# Patient Record
Sex: FEMALE | Race: WHITE | NOT HISPANIC OR LATINO | Employment: OTHER | URBAN - METROPOLITAN AREA
[De-identification: names, ages, dates, MRNs, and addresses within clinical notes are randomized per-mention and may not be internally consistent; named-entity substitution may affect disease eponyms.]

---

## 2017-03-13 RX ORDER — LEVOTHYROXINE SODIUM 88 MCG
TABLET ORAL
Qty: 90 TABLET | Refills: 0 | Status: SHIPPED | OUTPATIENT
Start: 2017-03-13 | End: 2017-03-27

## 2017-03-20 ENCOUNTER — LAB (OUTPATIENT)
Dept: ENDOCRINOLOGY | Age: 77
End: 2017-03-20

## 2017-03-20 DIAGNOSIS — E06.3 LYMPHOCYTIC THYROIDITIS: Primary | ICD-10-CM

## 2017-03-20 DIAGNOSIS — E03.8 OTHER SPECIFIED HYPOTHYROIDISM: ICD-10-CM

## 2017-03-20 DIAGNOSIS — E06.3 LYMPHOCYTIC THYROIDITIS: ICD-10-CM

## 2017-03-20 LAB
ALBUMIN SERPL-MCNC: 4.2 G/DL (ref 3.5–5.2)
ALBUMIN/GLOB SERPL: 1.9 G/DL
ALP SERPL-CCNC: 58 U/L (ref 39–117)
ALT SERPL-CCNC: 24 U/L (ref 1–33)
AST SERPL-CCNC: 25 U/L (ref 1–32)
BILIRUB SERPL-MCNC: 0.3 MG/DL (ref 0.1–1.2)
BUN SERPL-MCNC: 15 MG/DL (ref 8–23)
BUN/CREAT SERPL: 18.3 (ref 7–25)
CALCIUM SERPL-MCNC: 9.6 MG/DL (ref 8.6–10.5)
CHLORIDE SERPL-SCNC: 98 MMOL/L (ref 98–107)
CO2 SERPL-SCNC: 29 MMOL/L (ref 22–29)
CREAT SERPL-MCNC: 0.82 MG/DL (ref 0.57–1)
GLOBULIN SER CALC-MCNC: 2.2 GM/DL
GLUCOSE SERPL-MCNC: 90 MG/DL (ref 65–99)
POTASSIUM SERPL-SCNC: 4.4 MMOL/L (ref 3.5–5.2)
PROT SERPL-MCNC: 6.4 G/DL (ref 6–8.5)
SODIUM SERPL-SCNC: 140 MMOL/L (ref 136–145)
T4 FREE SERPL-MCNC: 1.24 NG/DL (ref 0.93–1.7)
TSH SERPL DL<=0.005 MIU/L-ACNC: 0.57 MIU/ML (ref 0.27–4.2)

## 2017-03-27 ENCOUNTER — OFFICE VISIT (OUTPATIENT)
Dept: ENDOCRINOLOGY | Age: 77
End: 2017-03-27

## 2017-03-27 VITALS
OXYGEN SATURATION: 97 % | DIASTOLIC BLOOD PRESSURE: 70 MMHG | SYSTOLIC BLOOD PRESSURE: 120 MMHG | HEART RATE: 71 BPM | WEIGHT: 169.4 LBS | HEIGHT: 65 IN | BODY MASS INDEX: 28.22 KG/M2

## 2017-03-27 DIAGNOSIS — E03.8 OTHER SPECIFIED HYPOTHYROIDISM: Primary | ICD-10-CM

## 2017-03-27 DIAGNOSIS — R53.82 CHRONIC FATIGUE: ICD-10-CM

## 2017-03-27 DIAGNOSIS — R63.5 ABNORMAL WEIGHT GAIN: ICD-10-CM

## 2017-03-27 DIAGNOSIS — E06.3 LYMPHOCYTIC THYROIDITIS: ICD-10-CM

## 2017-03-27 PROCEDURE — 99214 OFFICE O/P EST MOD 30 MIN: CPT | Performed by: INTERNAL MEDICINE

## 2017-06-13 ENCOUNTER — TELEPHONE (OUTPATIENT)
Dept: ENDOCRINOLOGY | Age: 77
End: 2017-06-13

## 2017-06-13 RX ORDER — LEVOTHYROXINE SODIUM 88 MCG
88 TABLET ORAL DAILY
Qty: 90 TABLET | Refills: 1 | Status: SHIPPED | OUTPATIENT
Start: 2017-06-13 | End: 2017-10-27 | Stop reason: SDUPTHER

## 2017-06-13 RX ORDER — LEVOTHYROXINE SODIUM 88 MCG
TABLET ORAL
Qty: 90 TABLET | Refills: 0 | Status: SHIPPED | OUTPATIENT
Start: 2017-06-13 | End: 2017-07-26 | Stop reason: SDUPTHER

## 2017-07-26 RX ORDER — LEVOTHYROXINE SODIUM 88 MCG
88 TABLET ORAL DAILY
Qty: 14 TABLET | Refills: 0 | Status: SHIPPED | OUTPATIENT
Start: 2017-07-26 | End: 2017-10-27

## 2017-07-26 RX ORDER — LEVOTHYROXINE SODIUM 88 MCG
TABLET ORAL
Qty: 90 TABLET | Refills: 0 | Status: SHIPPED | OUTPATIENT
Start: 2017-07-26 | End: 2017-07-26 | Stop reason: SDUPTHER

## 2017-09-25 ENCOUNTER — LAB (OUTPATIENT)
Dept: ENDOCRINOLOGY | Age: 77
End: 2017-09-25

## 2017-09-25 DIAGNOSIS — E03.8 OTHER SPECIFIED HYPOTHYROIDISM: Primary | ICD-10-CM

## 2017-09-25 DIAGNOSIS — E03.8 OTHER SPECIFIED HYPOTHYROIDISM: ICD-10-CM

## 2017-09-25 LAB
ALBUMIN SERPL-MCNC: 4.3 G/DL (ref 3.5–5.2)
ALBUMIN/GLOB SERPL: 1.7 G/DL
ALP SERPL-CCNC: 64 U/L (ref 39–117)
ALT SERPL-CCNC: 21 U/L (ref 1–33)
AST SERPL-CCNC: 22 U/L (ref 1–32)
BILIRUB SERPL-MCNC: 0.3 MG/DL (ref 0.1–1.2)
BUN SERPL-MCNC: 16 MG/DL (ref 8–23)
BUN/CREAT SERPL: 19 (ref 7–25)
CALCIUM SERPL-MCNC: 9.7 MG/DL (ref 8.6–10.5)
CHLORIDE SERPL-SCNC: 99 MMOL/L (ref 98–107)
CO2 SERPL-SCNC: 26.3 MMOL/L (ref 22–29)
CREAT SERPL-MCNC: 0.84 MG/DL (ref 0.57–1)
GLOBULIN SER CALC-MCNC: 2.5 GM/DL
GLUCOSE SERPL-MCNC: 97 MG/DL (ref 65–99)
POTASSIUM SERPL-SCNC: 4.8 MMOL/L (ref 3.5–5.2)
PROT SERPL-MCNC: 6.8 G/DL (ref 6–8.5)
SODIUM SERPL-SCNC: 139 MMOL/L (ref 136–145)
T4 FREE SERPL-MCNC: 1.24 NG/DL (ref 0.93–1.7)
TSH SERPL DL<=0.005 MIU/L-ACNC: 0.57 MIU/ML (ref 0.27–4.2)

## 2017-10-27 ENCOUNTER — OFFICE VISIT (OUTPATIENT)
Dept: ENDOCRINOLOGY | Age: 77
End: 2017-10-27

## 2017-10-27 VITALS
BODY MASS INDEX: 28.82 KG/M2 | HEART RATE: 59 BPM | SYSTOLIC BLOOD PRESSURE: 116 MMHG | WEIGHT: 168.8 LBS | HEIGHT: 64 IN | DIASTOLIC BLOOD PRESSURE: 70 MMHG

## 2017-10-27 DIAGNOSIS — E06.9 THYROIDITIS: ICD-10-CM

## 2017-10-27 DIAGNOSIS — R63.5 ABNORMAL WEIGHT GAIN: ICD-10-CM

## 2017-10-27 DIAGNOSIS — E03.8 OTHER SPECIFIED HYPOTHYROIDISM: Primary | ICD-10-CM

## 2017-10-27 DIAGNOSIS — R53.82 CHRONIC FATIGUE: ICD-10-CM

## 2017-10-27 DIAGNOSIS — E06.3 LYMPHOCYTIC THYROIDITIS: ICD-10-CM

## 2017-10-27 PROCEDURE — 99214 OFFICE O/P EST MOD 30 MIN: CPT | Performed by: INTERNAL MEDICINE

## 2017-10-27 RX ORDER — LEVOTHYROXINE SODIUM 75 MCG
75 TABLET ORAL DAILY
Qty: 90 TABLET | Refills: 2 | Status: SHIPPED | OUTPATIENT
Start: 2017-10-27 | End: 2018-08-28 | Stop reason: SDUPTHER

## 2018-04-11 ENCOUNTER — TELEPHONE (OUTPATIENT)
Dept: ENDOCRINOLOGY | Age: 78
End: 2018-04-11

## 2018-04-11 DIAGNOSIS — E03.8 OTHER SPECIFIED HYPOTHYROIDISM: Primary | ICD-10-CM

## 2018-04-11 DIAGNOSIS — E55.9 VITAMIN D DEFICIENCY: ICD-10-CM

## 2018-04-11 DIAGNOSIS — R53.82 CHRONIC FATIGUE: ICD-10-CM

## 2018-04-18 DIAGNOSIS — E55.9 VITAMIN D DEFICIENCY: ICD-10-CM

## 2018-04-18 DIAGNOSIS — E03.8 OTHER SPECIFIED HYPOTHYROIDISM: Primary | ICD-10-CM

## 2018-04-20 ENCOUNTER — RESULTS ENCOUNTER (OUTPATIENT)
Dept: ENDOCRINOLOGY | Age: 78
End: 2018-04-20

## 2018-04-20 DIAGNOSIS — E03.8 OTHER SPECIFIED HYPOTHYROIDISM: ICD-10-CM

## 2018-04-20 DIAGNOSIS — E55.9 VITAMIN D DEFICIENCY: ICD-10-CM

## 2018-04-24 ENCOUNTER — RESULTS ENCOUNTER (OUTPATIENT)
Dept: ENDOCRINOLOGY | Age: 78
End: 2018-04-24

## 2018-04-24 DIAGNOSIS — R53.82 CHRONIC FATIGUE: ICD-10-CM

## 2018-04-24 DIAGNOSIS — E55.9 VITAMIN D DEFICIENCY: ICD-10-CM

## 2018-04-24 DIAGNOSIS — E03.8 OTHER SPECIFIED HYPOTHYROIDISM: ICD-10-CM

## 2018-04-24 LAB
25(OH)D3+25(OH)D2 SERPL-MCNC: 35.7 NG/ML (ref 30–100)
ALBUMIN SERPL-MCNC: 4 G/DL (ref 3.5–5.2)
ALBUMIN/GLOB SERPL: 1.7 G/DL
ALP SERPL-CCNC: 59 U/L (ref 39–117)
ALT SERPL-CCNC: 18 U/L (ref 1–33)
AST SERPL-CCNC: 22 U/L (ref 1–32)
BILIRUB SERPL-MCNC: 0.3 MG/DL (ref 0.1–1.2)
BUN SERPL-MCNC: 15 MG/DL (ref 8–23)
BUN/CREAT SERPL: 18.3 (ref 7–25)
CALCIUM SERPL-MCNC: 8.8 MG/DL (ref 8.6–10.5)
CHLORIDE SERPL-SCNC: 99 MMOL/L (ref 98–107)
CO2 SERPL-SCNC: 26.8 MMOL/L (ref 22–29)
CREAT SERPL-MCNC: 0.82 MG/DL (ref 0.57–1)
GFR SERPLBLD CREATININE-BSD FMLA CKD-EPI: 68 ML/MIN/1.73
GFR SERPLBLD CREATININE-BSD FMLA CKD-EPI: 82 ML/MIN/1.73
GLOBULIN SER CALC-MCNC: 2.4 GM/DL
GLUCOSE SERPL-MCNC: 88 MG/DL (ref 65–99)
POTASSIUM SERPL-SCNC: 4.1 MMOL/L (ref 3.5–5.2)
PROT SERPL-MCNC: 6.4 G/DL (ref 6–8.5)
SODIUM SERPL-SCNC: 140 MMOL/L (ref 136–145)
T4 FREE SERPL-MCNC: 1.15 NG/DL (ref 0.93–1.7)
TSH SERPL DL<=0.005 MIU/L-ACNC: 0.95 MIU/ML (ref 0.27–4.2)

## 2018-05-01 ENCOUNTER — OFFICE VISIT (OUTPATIENT)
Dept: ENDOCRINOLOGY | Age: 78
End: 2018-05-01

## 2018-05-01 VITALS
SYSTOLIC BLOOD PRESSURE: 120 MMHG | BODY MASS INDEX: 28.68 KG/M2 | DIASTOLIC BLOOD PRESSURE: 66 MMHG | HEART RATE: 72 BPM | HEIGHT: 64 IN | WEIGHT: 168 LBS

## 2018-05-01 DIAGNOSIS — R53.82 CHRONIC FATIGUE: ICD-10-CM

## 2018-05-01 DIAGNOSIS — E03.8 OTHER SPECIFIED HYPOTHYROIDISM: Primary | ICD-10-CM

## 2018-05-01 DIAGNOSIS — E06.3 LYMPHOCYTIC THYROIDITIS: ICD-10-CM

## 2018-05-01 PROCEDURE — 99214 OFFICE O/P EST MOD 30 MIN: CPT | Performed by: INTERNAL MEDICINE

## 2018-05-06 ENCOUNTER — RESULTS ENCOUNTER (OUTPATIENT)
Dept: ENDOCRINOLOGY | Age: 78
End: 2018-05-06

## 2018-05-06 DIAGNOSIS — E06.3 LYMPHOCYTIC THYROIDITIS: ICD-10-CM

## 2018-05-06 DIAGNOSIS — R53.82 CHRONIC FATIGUE: ICD-10-CM

## 2018-05-06 DIAGNOSIS — E03.8 OTHER SPECIFIED HYPOTHYROIDISM: ICD-10-CM

## 2018-06-19 ENCOUNTER — RESULTS ENCOUNTER (OUTPATIENT)
Dept: ENDOCRINOLOGY | Age: 78
End: 2018-06-19

## 2018-06-19 DIAGNOSIS — E03.8 OTHER SPECIFIED HYPOTHYROIDISM: ICD-10-CM

## 2018-06-19 DIAGNOSIS — R53.82 CHRONIC FATIGUE: ICD-10-CM

## 2018-06-19 DIAGNOSIS — E06.3 LYMPHOCYTIC THYROIDITIS: ICD-10-CM

## 2018-08-29 RX ORDER — LEVOTHYROXINE SODIUM 75 MCG
TABLET ORAL
Qty: 90 TABLET | Refills: 1 | Status: SHIPPED | OUTPATIENT
Start: 2018-08-29 | End: 2019-03-14 | Stop reason: SDUPTHER

## 2018-09-05 DIAGNOSIS — E03.8 OTHER SPECIFIED HYPOTHYROIDISM: ICD-10-CM

## 2018-09-05 DIAGNOSIS — E55.9 VITAMIN D DEFICIENCY: Primary | ICD-10-CM

## 2018-09-07 ENCOUNTER — RESULTS ENCOUNTER (OUTPATIENT)
Dept: ENDOCRINOLOGY | Age: 78
End: 2018-09-07

## 2018-09-07 DIAGNOSIS — E55.9 VITAMIN D DEFICIENCY: ICD-10-CM

## 2018-09-07 DIAGNOSIS — E03.8 OTHER SPECIFIED HYPOTHYROIDISM: ICD-10-CM

## 2018-09-07 LAB
25(OH)D3+25(OH)D2 SERPL-MCNC: 45.4 NG/ML (ref 30–100)
ALBUMIN SERPL-MCNC: 4.4 G/DL (ref 3.5–5.2)
ALBUMIN/GLOB SERPL: 1.9 G/DL
ALP SERPL-CCNC: 72 U/L (ref 39–117)
ALT SERPL-CCNC: 21 U/L (ref 1–33)
AST SERPL-CCNC: 22 U/L (ref 1–32)
BILIRUB SERPL-MCNC: 0.2 MG/DL (ref 0.1–1.2)
BUN SERPL-MCNC: 13 MG/DL (ref 8–23)
BUN/CREAT SERPL: 16 (ref 7–25)
CALCIUM SERPL-MCNC: 9.3 MG/DL (ref 8.6–10.5)
CHLORIDE SERPL-SCNC: 99 MMOL/L (ref 98–107)
CO2 SERPL-SCNC: 27.5 MMOL/L (ref 22–29)
CREAT SERPL-MCNC: 0.81 MG/DL (ref 0.57–1)
GLOBULIN SER CALC-MCNC: 2.3 GM/DL
GLUCOSE SERPL-MCNC: 85 MG/DL (ref 65–99)
POTASSIUM SERPL-SCNC: 4.7 MMOL/L (ref 3.5–5.2)
PROT SERPL-MCNC: 6.7 G/DL (ref 6–8.5)
SODIUM SERPL-SCNC: 140 MMOL/L (ref 136–145)
T4 FREE SERPL-MCNC: 1.13 NG/DL (ref 0.93–1.7)
TSH SERPL DL<=0.005 MIU/L-ACNC: 0.94 MIU/ML (ref 0.27–4.2)

## 2018-09-18 ENCOUNTER — OFFICE VISIT (OUTPATIENT)
Dept: ENDOCRINOLOGY | Age: 78
End: 2018-09-18

## 2018-09-18 VITALS
WEIGHT: 166.2 LBS | HEART RATE: 67 BPM | SYSTOLIC BLOOD PRESSURE: 120 MMHG | DIASTOLIC BLOOD PRESSURE: 80 MMHG | HEIGHT: 64 IN | BODY MASS INDEX: 28.38 KG/M2

## 2018-09-18 DIAGNOSIS — R53.82 CHRONIC FATIGUE: ICD-10-CM

## 2018-09-18 DIAGNOSIS — E06.3 LYMPHOCYTIC THYROIDITIS: ICD-10-CM

## 2018-09-18 DIAGNOSIS — R63.5 ABNORMAL WEIGHT GAIN: ICD-10-CM

## 2018-09-18 DIAGNOSIS — E03.8 OTHER SPECIFIED HYPOTHYROIDISM: Primary | ICD-10-CM

## 2018-09-18 PROCEDURE — 99214 OFFICE O/P EST MOD 30 MIN: CPT | Performed by: INTERNAL MEDICINE

## 2019-03-05 ENCOUNTER — LAB (OUTPATIENT)
Dept: ENDOCRINOLOGY | Age: 79
End: 2019-03-05

## 2019-03-05 DIAGNOSIS — E55.9 VITAMIN D DEFICIENCY: ICD-10-CM

## 2019-03-05 DIAGNOSIS — E03.8 OTHER SPECIFIED HYPOTHYROIDISM: ICD-10-CM

## 2019-03-05 DIAGNOSIS — E03.8 OTHER SPECIFIED HYPOTHYROIDISM: Primary | ICD-10-CM

## 2019-03-05 LAB
25(OH)D3+25(OH)D2 SERPL-MCNC: 26.9 NG/ML (ref 30–100)
ALBUMIN SERPL-MCNC: 4.2 G/DL (ref 3.5–5.2)
ALBUMIN/GLOB SERPL: 1.7 G/DL
ALP SERPL-CCNC: 61 U/L (ref 39–117)
ALT SERPL-CCNC: 22 U/L (ref 1–33)
AST SERPL-CCNC: 23 U/L (ref 1–32)
BILIRUB SERPL-MCNC: 0.2 MG/DL (ref 0.1–1.2)
BUN SERPL-MCNC: 16 MG/DL (ref 8–23)
BUN/CREAT SERPL: 19 (ref 7–25)
CALCIUM SERPL-MCNC: 9.5 MG/DL (ref 8.6–10.5)
CHLORIDE SERPL-SCNC: 102 MMOL/L (ref 98–107)
CO2 SERPL-SCNC: 27.2 MMOL/L (ref 22–29)
CREAT SERPL-MCNC: 0.84 MG/DL (ref 0.57–1)
GLOBULIN SER CALC-MCNC: 2.5 GM/DL
GLUCOSE SERPL-MCNC: 88 MG/DL (ref 65–99)
POTASSIUM SERPL-SCNC: 5 MMOL/L (ref 3.5–5.2)
PROT SERPL-MCNC: 6.7 G/DL (ref 6–8.5)
SODIUM SERPL-SCNC: 142 MMOL/L (ref 136–145)
T4 FREE SERPL-MCNC: 1.12 NG/DL (ref 0.93–1.7)
TSH SERPL DL<=0.005 MIU/L-ACNC: 1.06 MIU/ML (ref 0.27–4.2)

## 2019-03-15 RX ORDER — LEVOTHYROXINE SODIUM 75 MCG
TABLET ORAL
Qty: 90 TABLET | Refills: 1 | Status: SHIPPED | OUTPATIENT
Start: 2019-03-15 | End: 2019-11-22

## 2019-03-19 ENCOUNTER — OFFICE VISIT (OUTPATIENT)
Dept: ENDOCRINOLOGY | Age: 79
End: 2019-03-19

## 2019-03-19 VITALS
BODY MASS INDEX: 28.22 KG/M2 | SYSTOLIC BLOOD PRESSURE: 126 MMHG | HEART RATE: 69 BPM | DIASTOLIC BLOOD PRESSURE: 72 MMHG | HEIGHT: 65 IN | WEIGHT: 169.4 LBS

## 2019-03-19 DIAGNOSIS — E03.8 OTHER SPECIFIED HYPOTHYROIDISM: Primary | ICD-10-CM

## 2019-03-19 DIAGNOSIS — R53.82 CHRONIC FATIGUE: ICD-10-CM

## 2019-03-19 DIAGNOSIS — R63.5 ABNORMAL WEIGHT GAIN: ICD-10-CM

## 2019-03-19 DIAGNOSIS — E06.3 LYMPHOCYTIC THYROIDITIS: ICD-10-CM

## 2019-03-19 PROCEDURE — 99214 OFFICE O/P EST MOD 30 MIN: CPT | Performed by: INTERNAL MEDICINE

## 2019-03-21 ENCOUNTER — TRANSCRIBE ORDERS (OUTPATIENT)
Dept: ADMINISTRATIVE | Facility: HOSPITAL | Age: 79
End: 2019-03-21

## 2019-03-21 DIAGNOSIS — N64.4 BREAST PAIN: Primary | ICD-10-CM

## 2019-03-26 ENCOUNTER — HOSPITAL ENCOUNTER (OUTPATIENT)
Dept: ULTRASOUND IMAGING | Facility: HOSPITAL | Age: 79
Discharge: HOME | End: 2019-03-26

## 2019-03-26 ENCOUNTER — HOSPITAL ENCOUNTER (OUTPATIENT)
Dept: MAMMOGRAPHY | Facility: HOSPITAL | Age: 79
Discharge: HOME | End: 2019-03-26
Admitting: INTERNAL MEDICINE

## 2019-03-26 DIAGNOSIS — N64.4 BREAST PAIN: ICD-10-CM

## 2019-03-26 PROCEDURE — 77066 DX MAMMO INCL CAD BI: CPT

## 2019-03-26 PROCEDURE — 76642 ULTRASOUND BREAST LIMITED: CPT

## 2019-11-08 ENCOUNTER — LAB (OUTPATIENT)
Dept: ENDOCRINOLOGY | Age: 79
End: 2019-11-08

## 2019-11-08 DIAGNOSIS — E55.9 VITAMIN D DEFICIENCY: ICD-10-CM

## 2019-11-08 DIAGNOSIS — E03.8 OTHER SPECIFIED HYPOTHYROIDISM: Primary | ICD-10-CM

## 2019-11-08 DIAGNOSIS — E03.8 OTHER SPECIFIED HYPOTHYROIDISM: ICD-10-CM

## 2019-11-08 LAB
25(OH)D3+25(OH)D2 SERPL-MCNC: 23.9 NG/ML (ref 30–100)
ALBUMIN SERPL-MCNC: 4.7 G/DL (ref 3.5–5.2)
ALBUMIN/GLOB SERPL: 2 G/DL
ALP SERPL-CCNC: 66 U/L (ref 39–117)
ALT SERPL-CCNC: 20 U/L (ref 1–33)
AST SERPL-CCNC: 23 U/L (ref 1–32)
BILIRUB SERPL-MCNC: 0.2 MG/DL (ref 0.2–1.2)
BUN SERPL-MCNC: 18 MG/DL (ref 8–23)
BUN/CREAT SERPL: 22.2 (ref 7–25)
CALCIUM SERPL-MCNC: 9.4 MG/DL (ref 8.6–10.5)
CHLORIDE SERPL-SCNC: 99 MMOL/L (ref 98–107)
CO2 SERPL-SCNC: 28.4 MMOL/L (ref 22–29)
CREAT SERPL-MCNC: 0.81 MG/DL (ref 0.57–1)
GLOBULIN SER CALC-MCNC: 2.3 GM/DL
GLUCOSE SERPL-MCNC: 94 MG/DL (ref 65–99)
POTASSIUM SERPL-SCNC: 4.9 MMOL/L (ref 3.5–5.2)
PROT SERPL-MCNC: 7 G/DL (ref 6–8.5)
SODIUM SERPL-SCNC: 139 MMOL/L (ref 136–145)
T4 FREE SERPL-MCNC: 1.11 NG/DL (ref 0.93–1.7)
TSH SERPL DL<=0.005 MIU/L-ACNC: 0.88 UIU/ML (ref 0.27–4.2)

## 2019-11-22 ENCOUNTER — OFFICE VISIT (OUTPATIENT)
Dept: ENDOCRINOLOGY | Age: 79
End: 2019-11-22

## 2019-11-22 ENCOUNTER — TELEPHONE (OUTPATIENT)
Dept: ENDOCRINOLOGY | Age: 79
End: 2019-11-22

## 2019-11-22 VITALS
SYSTOLIC BLOOD PRESSURE: 116 MMHG | HEART RATE: 86 BPM | BODY MASS INDEX: 28.49 KG/M2 | HEIGHT: 65 IN | DIASTOLIC BLOOD PRESSURE: 68 MMHG | WEIGHT: 171 LBS

## 2019-11-22 DIAGNOSIS — E06.3 LYMPHOCYTIC THYROIDITIS: ICD-10-CM

## 2019-11-22 DIAGNOSIS — R53.82 CHRONIC FATIGUE: ICD-10-CM

## 2019-11-22 DIAGNOSIS — E55.9 VITAMIN D DEFICIENCY: ICD-10-CM

## 2019-11-22 DIAGNOSIS — R63.5 ABNORMAL WEIGHT GAIN: ICD-10-CM

## 2019-11-22 DIAGNOSIS — E03.8 OTHER SPECIFIED HYPOTHYROIDISM: Primary | ICD-10-CM

## 2019-11-22 PROCEDURE — 99214 OFFICE O/P EST MOD 30 MIN: CPT | Performed by: INTERNAL MEDICINE

## 2019-11-22 RX ORDER — INFLUENZA A VIRUS A/MICHIGAN/45/2015 X-275 (H1N1) ANTIGEN (FORMALDEHYDE INACTIVATED), INFLUENZA A VIRUS A/SINGAPORE/INFIMH-16-0019/2016 IVR-186 (H3N2) ANTIGEN (FORMALDEHYDE INACTIVATED), AND INFLUENZA B VIRUS B/MARYLAND/15/2016 BX-69A (A B/COLORADO/6/2017-LIKE VIRUS) ANTIGEN (FORMALDEHYDE INACTIVATED) 60; 60; 60 UG/.5ML; UG/.5ML; UG/.5ML
INJECTION, SUSPENSION INTRAMUSCULAR
Refills: 0 | COMMUNITY
Start: 2019-09-30

## 2019-11-22 RX ORDER — NEOMYCIN SULFATE, POLYMYXIN B SULFATE AND DEXAMETHASONE 3.5; 10000; 1 MG/ML; [USP'U]/ML; MG/ML
SUSPENSION/ DROPS OPHTHALMIC
Refills: 1 | COMMUNITY
Start: 2019-09-26 | End: 2023-04-05

## 2019-11-22 RX ORDER — LEVOTHYROXINE SODIUM 125 MCG
62.5 TABLET ORAL DAILY
Qty: 45 TABLET | Refills: 3 | Status: SHIPPED | OUTPATIENT
Start: 2019-11-22 | End: 2020-06-15

## 2020-05-11 DIAGNOSIS — R63.5 ABNORMAL WEIGHT GAIN: ICD-10-CM

## 2020-05-11 DIAGNOSIS — R53.82 CHRONIC FATIGUE: ICD-10-CM

## 2020-05-11 DIAGNOSIS — E55.9 VITAMIN D DEFICIENCY: Primary | ICD-10-CM

## 2020-05-11 DIAGNOSIS — E03.8 OTHER SPECIFIED HYPOTHYROIDISM: ICD-10-CM

## 2020-06-03 DIAGNOSIS — E03.8 OTHER SPECIFIED HYPOTHYROIDISM: ICD-10-CM

## 2020-06-03 DIAGNOSIS — R63.5 ABNORMAL WEIGHT GAIN: ICD-10-CM

## 2020-06-03 DIAGNOSIS — E06.3 LYMPHOCYTIC THYROIDITIS: Primary | ICD-10-CM

## 2020-06-03 DIAGNOSIS — E55.9 VITAMIN D DEFICIENCY: ICD-10-CM

## 2020-06-11 LAB
25(OH)D3+25(OH)D2 SERPL-MCNC: 28.3 NG/ML (ref 30–100)
ALBUMIN SERPL-MCNC: 4 G/DL (ref 3.5–5.2)
ALBUMIN/GLOB SERPL: 1.7 G/DL
ALP SERPL-CCNC: 60 U/L (ref 39–117)
ALT SERPL-CCNC: 17 U/L (ref 1–33)
AST SERPL-CCNC: 19 U/L (ref 1–32)
BILIRUB SERPL-MCNC: 0.2 MG/DL (ref 0.2–1.2)
BUN SERPL-MCNC: 15 MG/DL (ref 8–23)
BUN/CREAT SERPL: 18.3 (ref 7–25)
CALCIUM SERPL-MCNC: 9.9 MG/DL (ref 8.6–10.5)
CHLORIDE SERPL-SCNC: 101 MMOL/L (ref 98–107)
CO2 SERPL-SCNC: 26.7 MMOL/L (ref 22–29)
CREAT SERPL-MCNC: 0.82 MG/DL (ref 0.57–1)
GLOBULIN SER CALC-MCNC: 2.3 GM/DL
GLUCOSE SERPL-MCNC: 103 MG/DL (ref 65–99)
POTASSIUM SERPL-SCNC: 5 MMOL/L (ref 3.5–5.2)
PROT SERPL-MCNC: 6.3 G/DL (ref 6–8.5)
SODIUM SERPL-SCNC: 139 MMOL/L (ref 136–145)
T4 FREE SERPL-MCNC: 1.25 NG/DL (ref 0.93–1.7)
THYROGLOB AB SERPL-ACNC: <1 IU/ML
THYROGLOB SERPL-MCNC: 1.4 NG/ML
THYROGLOB SERPL-MCNC: NORMAL NG/ML
TSH SERPL DL<=0.005 MIU/L-ACNC: 0.79 UIU/ML (ref 0.27–4.2)

## 2020-06-15 ENCOUNTER — OFFICE VISIT (OUTPATIENT)
Dept: ENDOCRINOLOGY | Age: 80
End: 2020-06-15

## 2020-06-15 VITALS
WEIGHT: 158.2 LBS | HEART RATE: 62 BPM | DIASTOLIC BLOOD PRESSURE: 62 MMHG | RESPIRATION RATE: 20 BRPM | HEIGHT: 64 IN | OXYGEN SATURATION: 98 % | BODY MASS INDEX: 27.01 KG/M2 | SYSTOLIC BLOOD PRESSURE: 128 MMHG

## 2020-06-15 DIAGNOSIS — R63.5 ABNORMAL WEIGHT GAIN: ICD-10-CM

## 2020-06-15 DIAGNOSIS — E03.8 OTHER SPECIFIED HYPOTHYROIDISM: Primary | ICD-10-CM

## 2020-06-15 DIAGNOSIS — E06.3 LYMPHOCYTIC THYROIDITIS: ICD-10-CM

## 2020-06-15 DIAGNOSIS — E55.9 VITAMIN D DEFICIENCY: ICD-10-CM

## 2020-06-15 DIAGNOSIS — R53.82 CHRONIC FATIGUE: ICD-10-CM

## 2020-06-15 PROCEDURE — 99214 OFFICE O/P EST MOD 30 MIN: CPT | Performed by: INTERNAL MEDICINE

## 2020-06-15 RX ORDER — LEVOTHYROXINE SODIUM 50 MCG
50 TABLET ORAL DAILY
Qty: 90 TABLET | Refills: 1 | Status: SHIPPED | OUTPATIENT
Start: 2020-06-15 | End: 2020-07-27 | Stop reason: SDUPTHER

## 2020-06-20 ENCOUNTER — RESULTS ENCOUNTER (OUTPATIENT)
Dept: ENDOCRINOLOGY | Age: 80
End: 2020-06-20

## 2020-06-20 DIAGNOSIS — E03.8 OTHER SPECIFIED HYPOTHYROIDISM: ICD-10-CM

## 2020-06-20 DIAGNOSIS — E06.3 LYMPHOCYTIC THYROIDITIS: ICD-10-CM

## 2020-07-17 ENCOUNTER — TELEPHONE (OUTPATIENT)
Dept: ENDOCRINOLOGY | Age: 80
End: 2020-07-17

## 2020-07-21 ENCOUNTER — TELEPHONE (OUTPATIENT)
Dept: ENDOCRINOLOGY | Age: 80
End: 2020-07-21

## 2020-07-23 ENCOUNTER — TELEPHONE (OUTPATIENT)
Dept: ENDOCRINOLOGY | Age: 80
End: 2020-07-23

## 2020-07-27 DIAGNOSIS — E03.8 OTHER SPECIFIED HYPOTHYROIDISM: ICD-10-CM

## 2020-07-27 DIAGNOSIS — E06.9 THYROIDITIS: Primary | ICD-10-CM

## 2020-07-27 RX ORDER — LEVOTHYROXINE SODIUM 50 MCG
50 TABLET ORAL DAILY
Qty: 90 TABLET | Refills: 1 | Status: SHIPPED | OUTPATIENT
Start: 2020-07-27 | End: 2021-07-27

## 2020-08-03 ENCOUNTER — RESULTS ENCOUNTER (OUTPATIENT)
Dept: ENDOCRINOLOGY | Age: 80
End: 2020-08-03

## 2020-08-03 DIAGNOSIS — E03.8 OTHER SPECIFIED HYPOTHYROIDISM: ICD-10-CM

## 2020-08-03 DIAGNOSIS — E06.3 LYMPHOCYTIC THYROIDITIS: ICD-10-CM

## 2020-12-07 ENCOUNTER — TRANSCRIBE ORDERS (OUTPATIENT)
Dept: LAB | Facility: HOSPITAL | Age: 80
End: 2020-12-07

## 2020-12-07 ENCOUNTER — LAB (OUTPATIENT)
Dept: LAB | Facility: HOSPITAL | Age: 80
End: 2020-12-07

## 2020-12-07 DIAGNOSIS — R07.9 CHEST PAIN, UNSPECIFIED TYPE: ICD-10-CM

## 2020-12-07 DIAGNOSIS — R07.9 CHEST PAIN, UNSPECIFIED TYPE: Primary | ICD-10-CM

## 2020-12-07 LAB
ALBUMIN SERPL-MCNC: 4.4 G/DL (ref 3.5–5.2)
ALBUMIN/GLOB SERPL: 1.7 G/DL
ALP SERPL-CCNC: 54 U/L (ref 39–117)
ALT SERPL W P-5'-P-CCNC: 15 U/L (ref 1–33)
ANION GAP SERPL CALCULATED.3IONS-SCNC: 12 MMOL/L (ref 5–15)
AST SERPL-CCNC: 21 U/L (ref 1–32)
BASOPHILS # BLD AUTO: 0.05 10*3/MM3 (ref 0–0.2)
BASOPHILS NFR BLD AUTO: 0.8 % (ref 0–1.5)
BILIRUB SERPL-MCNC: 0.2 MG/DL (ref 0–1.2)
BUN SERPL-MCNC: 19 MG/DL (ref 8–23)
BUN/CREAT SERPL: 23.8 (ref 7–25)
CALCIUM SPEC-SCNC: 9.3 MG/DL (ref 8.6–10.5)
CHLORIDE SERPL-SCNC: 104 MMOL/L (ref 98–107)
CO2 SERPL-SCNC: 23 MMOL/L (ref 22–29)
CREAT SERPL-MCNC: 0.8 MG/DL (ref 0.57–1)
D DIMER PPP FEU-MCNC: 2.69 MCGFEU/ML (ref 0–0.49)
DEPRECATED RDW RBC AUTO: 43.9 FL (ref 37–54)
EOSINOPHIL # BLD AUTO: 0.09 10*3/MM3 (ref 0–0.4)
EOSINOPHIL NFR BLD AUTO: 1.5 % (ref 0.3–6.2)
ERYTHROCYTE [DISTWIDTH] IN BLOOD BY AUTOMATED COUNT: 13.2 % (ref 12.3–15.4)
GFR SERPL CREATININE-BSD FRML MDRD: 69 ML/MIN/1.73
GLOBULIN UR ELPH-MCNC: 2.6 GM/DL
GLUCOSE SERPL-MCNC: 96 MG/DL (ref 65–99)
HCT VFR BLD AUTO: 44.4 % (ref 34–46.6)
HGB BLD-MCNC: 14.9 G/DL (ref 12–15.9)
IMM GRANULOCYTES # BLD AUTO: 0.02 10*3/MM3 (ref 0–0.05)
IMM GRANULOCYTES NFR BLD AUTO: 0.3 % (ref 0–0.5)
LYMPHOCYTES # BLD AUTO: 2.16 10*3/MM3 (ref 0.7–3.1)
LYMPHOCYTES NFR BLD AUTO: 35.1 % (ref 19.6–45.3)
MCH RBC QN AUTO: 30.6 PG (ref 26.6–33)
MCHC RBC AUTO-ENTMCNC: 33.6 G/DL (ref 31.5–35.7)
MCV RBC AUTO: 91.2 FL (ref 79–97)
MONOCYTES # BLD AUTO: 0.52 10*3/MM3 (ref 0.1–0.9)
MONOCYTES NFR BLD AUTO: 8.5 % (ref 5–12)
NEUTROPHILS NFR BLD AUTO: 3.31 10*3/MM3 (ref 1.7–7)
NEUTROPHILS NFR BLD AUTO: 53.8 % (ref 42.7–76)
NRBC BLD AUTO-RTO: 0 /100 WBC (ref 0–0.2)
PLATELET # BLD AUTO: 228 10*3/MM3 (ref 140–450)
PMV BLD AUTO: 9.2 FL (ref 6–12)
POTASSIUM SERPL-SCNC: 4.1 MMOL/L (ref 3.5–5.2)
PROT SERPL-MCNC: 7 G/DL (ref 6–8.5)
RBC # BLD AUTO: 4.87 10*6/MM3 (ref 3.77–5.28)
SODIUM SERPL-SCNC: 139 MMOL/L (ref 136–145)
TROPONIN T SERPL-MCNC: <0.01 NG/ML (ref 0–0.03)
WBC # BLD AUTO: 6.15 10*3/MM3 (ref 3.4–10.8)

## 2020-12-07 PROCEDURE — 84484 ASSAY OF TROPONIN QUANT: CPT

## 2020-12-07 PROCEDURE — 36415 COLL VENOUS BLD VENIPUNCTURE: CPT

## 2020-12-07 PROCEDURE — 80053 COMPREHEN METABOLIC PANEL: CPT

## 2020-12-07 PROCEDURE — 85025 COMPLETE CBC W/AUTO DIFF WBC: CPT

## 2020-12-07 PROCEDURE — 85379 FIBRIN DEGRADATION QUANT: CPT

## 2020-12-08 ENCOUNTER — TRANSCRIBE ORDERS (OUTPATIENT)
Dept: ADMINISTRATIVE | Facility: HOSPITAL | Age: 80
End: 2020-12-08

## 2020-12-08 ENCOUNTER — HOSPITAL ENCOUNTER (OUTPATIENT)
Dept: CT IMAGING | Facility: HOSPITAL | Age: 80
Discharge: HOME | End: 2020-12-08
Admitting: INTERNAL MEDICINE

## 2020-12-08 DIAGNOSIS — R79.89 D-DIMER, ELEVATED: Primary | ICD-10-CM

## 2020-12-08 DIAGNOSIS — R79.89 D-DIMER, ELEVATED: ICD-10-CM

## 2020-12-08 LAB — CREAT BLDA-MCNC: 1.1 MG/DL (ref 0.6–1.3)

## 2020-12-08 PROCEDURE — 0 IOPAMIDOL PER 1 ML: Performed by: INTERNAL MEDICINE

## 2020-12-08 PROCEDURE — 71275 CT ANGIOGRAPHY CHEST: CPT

## 2020-12-08 PROCEDURE — 82565 ASSAY OF CREATININE: CPT

## 2020-12-08 RX ADMIN — IOPAMIDOL 95 ML: 755 INJECTION, SOLUTION INTRAVENOUS at 15:59

## 2020-12-14 ENCOUNTER — TRANSCRIBE ORDERS (OUTPATIENT)
Dept: ADMINISTRATIVE | Facility: HOSPITAL | Age: 80
End: 2020-12-14

## 2020-12-14 DIAGNOSIS — R07.9 CHEST PAIN, UNSPECIFIED TYPE: Primary | ICD-10-CM

## 2020-12-21 ENCOUNTER — TRANSCRIBE ORDERS (OUTPATIENT)
Dept: ADMINISTRATIVE | Facility: HOSPITAL | Age: 80
End: 2020-12-21

## 2020-12-21 DIAGNOSIS — R07.9 CHEST PAIN, UNSPECIFIED TYPE: Primary | ICD-10-CM

## 2020-12-31 ENCOUNTER — TRANSCRIBE ORDERS (OUTPATIENT)
Dept: SLEEP MEDICINE | Facility: HOSPITAL | Age: 80
End: 2020-12-31

## 2020-12-31 DIAGNOSIS — Z01.818 OTHER SPECIFIED PRE-OPERATIVE EXAMINATION: Primary | ICD-10-CM

## 2021-01-06 ENCOUNTER — LAB (OUTPATIENT)
Dept: LAB | Facility: HOSPITAL | Age: 81
End: 2021-01-06

## 2021-01-06 DIAGNOSIS — Z01.818 OTHER SPECIFIED PRE-OPERATIVE EXAMINATION: ICD-10-CM

## 2021-01-06 PROCEDURE — C9803 HOPD COVID-19 SPEC COLLECT: HCPCS

## 2021-01-06 PROCEDURE — U0004 COV-19 TEST NON-CDC HGH THRU: HCPCS

## 2021-01-07 LAB — SARS-COV-2 RNA RESP QL NAA+PROBE: NOT DETECTED

## 2021-01-08 ENCOUNTER — HOSPITAL ENCOUNTER (OUTPATIENT)
Dept: CARDIOLOGY | Facility: HOSPITAL | Age: 81
Discharge: HOME | End: 2021-01-08
Admitting: INTERNAL MEDICINE

## 2021-01-08 VITALS
HEART RATE: 51 BPM | DIASTOLIC BLOOD PRESSURE: 73 MMHG | WEIGHT: 158 LBS | BODY MASS INDEX: 28 KG/M2 | SYSTOLIC BLOOD PRESSURE: 151 MMHG | HEIGHT: 63 IN

## 2021-01-08 DIAGNOSIS — R07.9 CHEST PAIN, UNSPECIFIED TYPE: ICD-10-CM

## 2021-01-08 LAB
AORTIC ARCH: 2.7 CM
ASCENDING AORTA: 3.1 CM
BH CV ECHO MEAS - ACS: 1.8 CM
BH CV ECHO MEAS - AO ARCH DIAM (PROXIMAL TRANS.): 2.7 CM
BH CV ECHO MEAS - AO MAX PG (FULL): 6 MMHG
BH CV ECHO MEAS - AO MAX PG: 10.5 MMHG
BH CV ECHO MEAS - AO MEAN PG (FULL): 3 MMHG
BH CV ECHO MEAS - AO MEAN PG: 5 MMHG
BH CV ECHO MEAS - AO ROOT AREA (BSA CORRECTED): 1.3
BH CV ECHO MEAS - AO ROOT AREA: 4.2 CM^2
BH CV ECHO MEAS - AO ROOT DIAM: 2.3 CM
BH CV ECHO MEAS - AO V2 MAX: 162 CM/SEC
BH CV ECHO MEAS - AO V2 MEAN: 109 CM/SEC
BH CV ECHO MEAS - AO V2 VTI: 38.4 CM
BH CV ECHO MEAS - ASC AORTA: 3.1 CM
BH CV ECHO MEAS - AVA(I,A): 2 CM^2
BH CV ECHO MEAS - AVA(I,D): 2 CM^2
BH CV ECHO MEAS - AVA(V,A): 2.1 CM^2
BH CV ECHO MEAS - AVA(V,D): 2.1 CM^2
BH CV ECHO MEAS - BSA(HAYCOCK): 1.8 M^2
BH CV ECHO MEAS - BSA: 1.7 M^2
BH CV ECHO MEAS - BZI_BMI: 28 KILOGRAMS/M^2
BH CV ECHO MEAS - BZI_METRIC_HEIGHT: 160 CM
BH CV ECHO MEAS - BZI_METRIC_WEIGHT: 71.7 KG
BH CV ECHO MEAS - EDV(CUBED): 50.7 ML
BH CV ECHO MEAS - EDV(MOD-SP2): 74 ML
BH CV ECHO MEAS - EDV(MOD-SP4): 74 ML
BH CV ECHO MEAS - EDV(TEICH): 58.1 ML
BH CV ECHO MEAS - EF(CUBED): 81.7 %
BH CV ECHO MEAS - EF(MOD-BP): 65 %
BH CV ECHO MEAS - EF(MOD-SP2): 81.1 %
BH CV ECHO MEAS - EF(MOD-SP4): 79.7 %
BH CV ECHO MEAS - EF(TEICH): 75.2 %
BH CV ECHO MEAS - ESV(CUBED): 9.3 ML
BH CV ECHO MEAS - ESV(MOD-SP2): 14 ML
BH CV ECHO MEAS - ESV(MOD-SP4): 15 ML
BH CV ECHO MEAS - ESV(TEICH): 14.4 ML
BH CV ECHO MEAS - FS: 43.2 %
BH CV ECHO MEAS - IVS/LVPW: 1
BH CV ECHO MEAS - IVSD: 0.8 CM
BH CV ECHO MEAS - LAT PEAK E' VEL: 10.3 CM/SEC
BH CV ECHO MEAS - LV DIASTOLIC VOL/BSA (35-75): 42.3 ML/M^2
BH CV ECHO MEAS - LV MASS(C)D: 82.3 GRAMS
BH CV ECHO MEAS - LV MASS(C)DI: 47.1 GRAMS/M^2
BH CV ECHO MEAS - LV MAX PG: 4.5 MMHG
BH CV ECHO MEAS - LV MEAN PG: 2 MMHG
BH CV ECHO MEAS - LV SYSTOLIC VOL/BSA (12-30): 8.6 ML/M^2
BH CV ECHO MEAS - LV V1 MAX: 106 CM/SEC
BH CV ECHO MEAS - LV V1 MEAN: 70.5 CM/SEC
BH CV ECHO MEAS - LV V1 VTI: 24.4 CM
BH CV ECHO MEAS - LVIDD: 3.7 CM
BH CV ECHO MEAS - LVIDS: 2.1 CM
BH CV ECHO MEAS - LVLD AP2: 7.1 CM
BH CV ECHO MEAS - LVLD AP4: 8 CM
BH CV ECHO MEAS - LVLS AP2: 5.5 CM
BH CV ECHO MEAS - LVLS AP4: 6.3 CM
BH CV ECHO MEAS - LVOT AREA (M): 3.1 CM^2
BH CV ECHO MEAS - LVOT AREA: 3.1 CM^2
BH CV ECHO MEAS - LVOT DIAM: 2 CM
BH CV ECHO MEAS - LVPWD: 0.8 CM
BH CV ECHO MEAS - MED PEAK E' VEL: 7.8 CM/SEC
BH CV ECHO MEAS - MV A DUR: 0.24 SEC
BH CV ECHO MEAS - MV A MAX VEL: 108 CM/SEC
BH CV ECHO MEAS - MV DEC SLOPE: 148 CM/SEC^2
BH CV ECHO MEAS - MV DEC TIME: 0.23 SEC
BH CV ECHO MEAS - MV E MAX VEL: 79.3 CM/SEC
BH CV ECHO MEAS - MV E/A: 0.73
BH CV ECHO MEAS - MV MAX PG: 3.7 MMHG
BH CV ECHO MEAS - MV MEAN PG: 2 MMHG
BH CV ECHO MEAS - MV P1/2T MAX VEL: 82.8 CM/SEC
BH CV ECHO MEAS - MV P1/2T: 163.9 MSEC
BH CV ECHO MEAS - MV V2 MAX: 96.1 CM/SEC
BH CV ECHO MEAS - MV V2 MEAN: 57.2 CM/SEC
BH CV ECHO MEAS - MV V2 VTI: 39.3 CM
BH CV ECHO MEAS - MVA P1/2T LCG: 2.7 CM^2
BH CV ECHO MEAS - MVA(P1/2T): 1.3 CM^2
BH CV ECHO MEAS - MVA(VTI): 2 CM^2
BH CV ECHO MEAS - PA ACC TIME: 0.11 SEC
BH CV ECHO MEAS - PA MAX PG (FULL): 1.6 MMHG
BH CV ECHO MEAS - PA MAX PG: 2.4 MMHG
BH CV ECHO MEAS - PA PR(ACCEL): 27.7 MMHG
BH CV ECHO MEAS - PA V2 MAX: 77.4 CM/SEC
BH CV ECHO MEAS - PULM A REVS DUR: 0.19 SEC
BH CV ECHO MEAS - PULM A REVS VEL: 23.6 CM/SEC
BH CV ECHO MEAS - PULM DIAS VEL: 23.1 CM/SEC
BH CV ECHO MEAS - PULM S/D: 1.5
BH CV ECHO MEAS - PULM SYS VEL: 35.1 CM/SEC
BH CV ECHO MEAS - PVA(V,A): 2 CM^2
BH CV ECHO MEAS - PVA(V,D): 2 CM^2
BH CV ECHO MEAS - QP/QS: 0.54
BH CV ECHO MEAS - RAP SYSTOLE: 3 MMHG
BH CV ECHO MEAS - RV MAX PG: 0.84 MMHG
BH CV ECHO MEAS - RV MEAN PG: 0 MMHG
BH CV ECHO MEAS - RV V1 MAX: 45.7 CM/SEC
BH CV ECHO MEAS - RV V1 MEAN: 32.6 CM/SEC
BH CV ECHO MEAS - RV V1 VTI: 12 CM
BH CV ECHO MEAS - RVOT AREA: 3.5 CM^2
BH CV ECHO MEAS - RVOT DIAM: 2.1 CM
BH CV ECHO MEAS - RVSP: 27 MMHG
BH CV ECHO MEAS - SI(AO): 91.2 ML/M^2
BH CV ECHO MEAS - SI(CUBED): 23.7 ML/M^2
BH CV ECHO MEAS - SI(LVOT): 43.8 ML/M^2
BH CV ECHO MEAS - SI(MOD-SP2): 34.3 ML/M^2
BH CV ECHO MEAS - SI(MOD-SP4): 33.7 ML/M^2
BH CV ECHO MEAS - SI(TEICH): 25 ML/M^2
BH CV ECHO MEAS - SV(AO): 159.5 ML
BH CV ECHO MEAS - SV(CUBED): 41.4 ML
BH CV ECHO MEAS - SV(LVOT): 76.7 ML
BH CV ECHO MEAS - SV(MOD-SP2): 60 ML
BH CV ECHO MEAS - SV(MOD-SP4): 59 ML
BH CV ECHO MEAS - SV(RVOT): 41.6 ML
BH CV ECHO MEAS - SV(TEICH): 43.7 ML
BH CV ECHO MEAS - TAPSE (>1.6): 2.9 CM
BH CV ECHO MEAS - TR MAX VEL: 243 CM/SEC
BH CV ECHO MEASUREMENTS AVERAGE E/E' RATIO: 8.76
BH CV STRESS BP STAGE 1: NORMAL
BH CV STRESS BP STAGE 2: NORMAL
BH CV STRESS DURATION MIN STAGE 1: 3
BH CV STRESS DURATION MIN STAGE 2: 3
BH CV STRESS DURATION SEC STAGE 1: 0
BH CV STRESS DURATION SEC STAGE 2: 0
BH CV STRESS ECHO POST STRESS EJECTION FRACTION EF: 80 %
BH CV STRESS GRADE STAGE 1: 10
BH CV STRESS GRADE STAGE 2: 12
BH CV STRESS HR STAGE 1: 94
BH CV STRESS HR STAGE 2: 124
BH CV STRESS METS STAGE 1: 5
BH CV STRESS METS STAGE 2: 7.5
BH CV STRESS PROTOCOL 1: NORMAL
BH CV STRESS RECOVERY BP: NORMAL MMHG
BH CV STRESS RECOVERY HR: 61 BPM
BH CV STRESS SPEED STAGE 1: 1.7
BH CV STRESS SPEED STAGE 2: 2.5
BH CV STRESS STAGE 1: 1
BH CV STRESS STAGE 2: 2
BH CV XLRA - RV BASE: 3.5 CM
BH CV XLRA - RV LENGTH: 7.2 CM
BH CV XLRA - RV MID: 2.7 CM
BH CV XLRA - TDI S': 11.7 CM/SEC
LEFT ATRIUM VOLUME INDEX: 24 ML/M2
MAXIMAL PREDICTED HEART RATE: 140 BPM
PERCENT MAX PREDICTED HR: 90 %
SINUS: 2.9 CM
STJ: 2.8 CM
STRESS BASELINE BP: NORMAL MMHG
STRESS BASELINE HR: 65 BPM
STRESS PERCENT HR: 106 %
STRESS POST ESTIMATED WORKLOAD: 7.1 METS
STRESS POST EXERCISE DUR MIN: 6 MIN
STRESS POST EXERCISE DUR SEC: 0 SEC
STRESS POST PEAK BP: NORMAL MMHG
STRESS POST PEAK HR: 126 BPM
STRESS TARGET HR: 119 BPM

## 2021-01-08 PROCEDURE — 93320 DOPPLER ECHO COMPLETE: CPT

## 2021-01-08 PROCEDURE — 93017 CV STRESS TEST TRACING ONLY: CPT

## 2021-01-08 PROCEDURE — 93018 CV STRESS TEST I&R ONLY: CPT | Performed by: INTERNAL MEDICINE

## 2021-01-08 PROCEDURE — 93350 STRESS TTE ONLY: CPT | Performed by: INTERNAL MEDICINE

## 2021-01-08 PROCEDURE — 25010000002 PERFLUTREN (DEFINITY) 8.476 MG IN SODIUM CHLORIDE (PF) 0.9 % 10 ML INJECTION: Performed by: INTERNAL MEDICINE

## 2021-01-08 PROCEDURE — 93352 ADMIN ECG CONTRAST AGENT: CPT | Performed by: INTERNAL MEDICINE

## 2021-01-08 PROCEDURE — 93016 CV STRESS TEST SUPVJ ONLY: CPT | Performed by: INTERNAL MEDICINE

## 2021-01-08 PROCEDURE — 93350 STRESS TTE ONLY: CPT

## 2021-01-08 PROCEDURE — 93325 DOPPLER ECHO COLOR FLOW MAPG: CPT

## 2021-01-08 PROCEDURE — 93325 DOPPLER ECHO COLOR FLOW MAPG: CPT | Performed by: INTERNAL MEDICINE

## 2021-01-08 PROCEDURE — 93320 DOPPLER ECHO COMPLETE: CPT | Performed by: INTERNAL MEDICINE

## 2021-01-08 RX ADMIN — SODIUM CHLORIDE 5 ML: 9 INJECTION INTRAMUSCULAR; INTRAVENOUS; SUBCUTANEOUS at 11:52

## 2021-03-09 DIAGNOSIS — Z23 IMMUNIZATION DUE: ICD-10-CM

## 2021-09-18 ENCOUNTER — IMMUNIZATION (OUTPATIENT)
Dept: VACCINE CLINIC | Facility: HOSPITAL | Age: 81
End: 2021-09-18

## 2021-09-18 PROCEDURE — 0001A: CPT | Performed by: INTERNAL MEDICINE

## 2021-09-18 PROCEDURE — 91300 HC SARSCOV02 VAC 30MCG/0.3ML IM: CPT | Performed by: INTERNAL MEDICINE

## 2021-11-08 ENCOUNTER — LAB (OUTPATIENT)
Dept: LAB | Facility: HOSPITAL | Age: 81
End: 2021-11-08

## 2021-11-08 ENCOUNTER — HOSPITAL ENCOUNTER (OUTPATIENT)
Dept: GENERAL RADIOLOGY | Facility: HOSPITAL | Age: 81
Discharge: HOME | End: 2021-11-08

## 2021-11-08 ENCOUNTER — TRANSCRIBE ORDERS (OUTPATIENT)
Dept: ADMINISTRATIVE | Facility: HOSPITAL | Age: 81
End: 2021-11-08

## 2021-11-08 DIAGNOSIS — R05.9 COUGH: ICD-10-CM

## 2021-11-08 DIAGNOSIS — R05.9 COUGH: Primary | ICD-10-CM

## 2021-11-08 LAB
ALBUMIN SERPL-MCNC: 4.2 G/DL (ref 3.5–5.2)
ALBUMIN/GLOB SERPL: 1.4 G/DL
ALP SERPL-CCNC: 49 U/L (ref 39–117)
ALT SERPL W P-5'-P-CCNC: 28 U/L (ref 1–33)
ANION GAP SERPL CALCULATED.3IONS-SCNC: 11.6 MMOL/L (ref 5–15)
AST SERPL-CCNC: 24 U/L (ref 1–32)
BASOPHILS # BLD AUTO: 0.05 10*3/MM3 (ref 0–0.2)
BASOPHILS NFR BLD AUTO: 0.5 % (ref 0–1.5)
BILIRUB SERPL-MCNC: 0.2 MG/DL (ref 0–1.2)
BUN SERPL-MCNC: 26 MG/DL (ref 8–23)
BUN/CREAT SERPL: 31 (ref 7–25)
CALCIUM SPEC-SCNC: 9.3 MG/DL (ref 8.6–10.5)
CHLORIDE SERPL-SCNC: 98 MMOL/L (ref 98–107)
CO2 SERPL-SCNC: 26.4 MMOL/L (ref 22–29)
CREAT SERPL-MCNC: 0.84 MG/DL (ref 0.57–1)
DEPRECATED RDW RBC AUTO: 44.6 FL (ref 37–54)
EOSINOPHIL # BLD AUTO: 0.15 10*3/MM3 (ref 0–0.4)
EOSINOPHIL NFR BLD AUTO: 1.5 % (ref 0.3–6.2)
ERYTHROCYTE [DISTWIDTH] IN BLOOD BY AUTOMATED COUNT: 12.8 % (ref 12.3–15.4)
GFR SERPL CREATININE-BSD FRML MDRD: 65 ML/MIN/1.73
GLOBULIN UR ELPH-MCNC: 2.9 GM/DL
GLUCOSE SERPL-MCNC: 92 MG/DL (ref 65–99)
HCT VFR BLD AUTO: 48.5 % (ref 34–46.6)
HGB BLD-MCNC: 15.4 G/DL (ref 12–15.9)
IMM GRANULOCYTES # BLD AUTO: 0.11 10*3/MM3 (ref 0–0.05)
IMM GRANULOCYTES NFR BLD AUTO: 1.1 % (ref 0–0.5)
LYMPHOCYTES # BLD AUTO: 3.25 10*3/MM3 (ref 0.7–3.1)
LYMPHOCYTES NFR BLD AUTO: 32.5 % (ref 19.6–45.3)
MCH RBC QN AUTO: 30 PG (ref 26.6–33)
MCHC RBC AUTO-ENTMCNC: 31.8 G/DL (ref 31.5–35.7)
MCV RBC AUTO: 94.5 FL (ref 79–97)
MONOCYTES # BLD AUTO: 0.97 10*3/MM3 (ref 0.1–0.9)
MONOCYTES NFR BLD AUTO: 9.7 % (ref 5–12)
NEUTROPHILS NFR BLD AUTO: 5.48 10*3/MM3 (ref 1.7–7)
NEUTROPHILS NFR BLD AUTO: 54.7 % (ref 42.7–76)
NRBC BLD AUTO-RTO: 0 /100 WBC (ref 0–0.2)
PLATELET # BLD AUTO: 264 10*3/MM3 (ref 140–450)
PMV BLD AUTO: 8.9 FL (ref 6–12)
POTASSIUM SERPL-SCNC: 4.2 MMOL/L (ref 3.5–5.2)
PROT SERPL-MCNC: 7.1 G/DL (ref 6–8.5)
RBC # BLD AUTO: 5.13 10*6/MM3 (ref 3.77–5.28)
SODIUM SERPL-SCNC: 136 MMOL/L (ref 136–145)
WBC # BLD AUTO: 10.01 10*3/MM3 (ref 3.4–10.8)

## 2021-11-08 PROCEDURE — 71046 X-RAY EXAM CHEST 2 VIEWS: CPT

## 2021-11-08 PROCEDURE — 85025 COMPLETE CBC W/AUTO DIFF WBC: CPT

## 2021-11-08 PROCEDURE — 80053 COMPREHEN METABOLIC PANEL: CPT

## 2021-11-08 PROCEDURE — 36415 COLL VENOUS BLD VENIPUNCTURE: CPT

## 2022-08-22 ENCOUNTER — TRANSCRIBE ORDERS (OUTPATIENT)
Dept: PHYSICAL THERAPY | Facility: HOSPITAL | Age: 82
End: 2022-08-22

## 2022-08-22 ENCOUNTER — TRANSCRIBE ORDERS (OUTPATIENT)
Dept: ADMINISTRATIVE | Facility: HOSPITAL | Age: 82
End: 2022-08-22

## 2022-08-22 DIAGNOSIS — R42 VERTIGO: Primary | ICD-10-CM

## 2022-08-29 ENCOUNTER — HOSPITAL ENCOUNTER (OUTPATIENT)
Dept: PHYSICAL THERAPY | Facility: HOSPITAL | Age: 82
Setting detail: THERAPIES SERIES
Discharge: HOME | End: 2022-08-29

## 2022-08-29 DIAGNOSIS — R42 DIZZINESS: Primary | ICD-10-CM

## 2022-08-29 DIAGNOSIS — H81.10 BENIGN PAROXYSMAL POSITIONAL VERTIGO, UNSPECIFIED LATERALITY: ICD-10-CM

## 2022-08-29 PROCEDURE — 97161 PT EVAL LOW COMPLEX 20 MIN: CPT | Performed by: PHYSICAL THERAPIST

## 2022-08-29 PROCEDURE — 95992 CANALITH REPOSITIONING PROC: CPT | Performed by: PHYSICAL THERAPIST

## 2022-08-31 ENCOUNTER — HOSPITAL ENCOUNTER (OUTPATIENT)
Dept: PHYSICAL THERAPY | Facility: HOSPITAL | Age: 82
Setting detail: THERAPIES SERIES
Discharge: HOME | End: 2022-08-31

## 2022-08-31 DIAGNOSIS — R42 DIZZINESS: Primary | ICD-10-CM

## 2022-08-31 DIAGNOSIS — H81.10 BENIGN PAROXYSMAL POSITIONAL VERTIGO, UNSPECIFIED LATERALITY: ICD-10-CM

## 2022-08-31 PROCEDURE — 95992 CANALITH REPOSITIONING PROC: CPT | Performed by: PHYSICAL THERAPIST

## 2022-09-02 ENCOUNTER — HOSPITAL ENCOUNTER (OUTPATIENT)
Dept: PHYSICAL THERAPY | Facility: HOSPITAL | Age: 82
Setting detail: THERAPIES SERIES
Discharge: HOME | End: 2022-09-02

## 2022-09-02 DIAGNOSIS — H81.10 BENIGN PAROXYSMAL POSITIONAL VERTIGO, UNSPECIFIED LATERALITY: ICD-10-CM

## 2022-09-02 DIAGNOSIS — R42 DIZZINESS: Primary | ICD-10-CM

## 2022-09-02 PROCEDURE — 95992 CANALITH REPOSITIONING PROC: CPT | Performed by: PHYSICAL THERAPIST

## 2022-09-09 ENCOUNTER — HOSPITAL ENCOUNTER (OUTPATIENT)
Dept: PHYSICAL THERAPY | Facility: HOSPITAL | Age: 82
Setting detail: THERAPIES SERIES
Discharge: HOME | End: 2022-09-09

## 2022-09-09 DIAGNOSIS — H81.10 BENIGN PAROXYSMAL POSITIONAL VERTIGO, UNSPECIFIED LATERALITY: ICD-10-CM

## 2022-09-09 DIAGNOSIS — R42 DIZZINESS: Primary | ICD-10-CM

## 2022-09-09 PROCEDURE — 97112 NEUROMUSCULAR REEDUCATION: CPT | Performed by: PHYSICAL THERAPIST

## 2022-09-15 ENCOUNTER — HOSPITAL ENCOUNTER (OUTPATIENT)
Dept: MRI IMAGING | Facility: HOSPITAL | Age: 82
Discharge: HOME | End: 2022-09-15
Admitting: INTERNAL MEDICINE

## 2022-09-15 DIAGNOSIS — R42 VERTIGO: ICD-10-CM

## 2022-09-15 PROCEDURE — 82565 ASSAY OF CREATININE: CPT

## 2022-09-15 PROCEDURE — A9577 INJ MULTIHANCE: HCPCS | Performed by: INTERNAL MEDICINE

## 2022-09-15 PROCEDURE — 0 GADOBENATE DIMEGLUMINE 529 MG/ML SOLUTION: Performed by: INTERNAL MEDICINE

## 2022-09-15 PROCEDURE — 70553 MRI BRAIN STEM W/O & W/DYE: CPT

## 2022-09-15 RX ADMIN — GADOBENATE DIMEGLUMINE 14 ML: 529 INJECTION, SOLUTION INTRAVENOUS at 14:43

## 2022-09-20 ENCOUNTER — HOSPITAL ENCOUNTER (OUTPATIENT)
Dept: PHYSICAL THERAPY | Facility: HOSPITAL | Age: 82
Setting detail: THERAPIES SERIES
Discharge: HOME | End: 2022-09-20

## 2022-09-20 DIAGNOSIS — R42 DIZZINESS: Primary | ICD-10-CM

## 2022-09-20 DIAGNOSIS — H81.10 BENIGN PAROXYSMAL POSITIONAL VERTIGO, UNSPECIFIED LATERALITY: ICD-10-CM

## 2022-09-20 LAB — CREAT BLDA-MCNC: 0.8 MG/DL (ref 0.6–1.3)

## 2022-09-20 PROCEDURE — 97530 THERAPEUTIC ACTIVITIES: CPT | Performed by: PHYSICAL THERAPIST

## 2022-12-09 ENCOUNTER — TRANSCRIBE ORDERS (OUTPATIENT)
Dept: ADMINISTRATIVE | Facility: HOSPITAL | Age: 82
End: 2022-12-09

## 2022-12-09 ENCOUNTER — HOSPITAL ENCOUNTER (OUTPATIENT)
Dept: CT IMAGING | Facility: HOSPITAL | Age: 82
Discharge: HOME | End: 2022-12-09
Admitting: INTERNAL MEDICINE

## 2022-12-09 DIAGNOSIS — S09.90XA HEAD TRAUMA, INITIAL ENCOUNTER: ICD-10-CM

## 2022-12-09 DIAGNOSIS — S09.90XA HEAD TRAUMA, INITIAL ENCOUNTER: Primary | ICD-10-CM

## 2022-12-09 PROCEDURE — 70450 CT HEAD/BRAIN W/O DYE: CPT

## 2022-12-27 ENCOUNTER — TRANSCRIBE ORDERS (OUTPATIENT)
Dept: ADMINISTRATIVE | Facility: HOSPITAL | Age: 82
End: 2022-12-27

## 2022-12-27 ENCOUNTER — LAB (OUTPATIENT)
Dept: LAB | Facility: HOSPITAL | Age: 82
End: 2022-12-27

## 2022-12-27 DIAGNOSIS — J20.9 ACUTE BRONCHITIS, UNSPECIFIED ORGANISM: Primary | ICD-10-CM

## 2022-12-27 DIAGNOSIS — J44.9 CHRONIC OBSTRUCTIVE PULMONARY DISEASE, UNSPECIFIED COPD TYPE: ICD-10-CM

## 2022-12-27 LAB
B PARAPERT DNA SPEC QL NAA+PROBE: NOT DETECTED
B PERT DNA SPEC QL NAA+PROBE: NOT DETECTED
C PNEUM DNA NPH QL NAA+NON-PROBE: NOT DETECTED
FLUAV SUBTYP SPEC NAA+PROBE: NOT DETECTED
FLUBV RNA ISLT QL NAA+PROBE: NOT DETECTED
HADV DNA SPEC NAA+PROBE: NOT DETECTED
HCOV 229E RNA SPEC QL NAA+PROBE: NOT DETECTED
HCOV HKU1 RNA SPEC QL NAA+PROBE: DETECTED
HCOV NL63 RNA SPEC QL NAA+PROBE: NOT DETECTED
HCOV OC43 RNA SPEC QL NAA+PROBE: NOT DETECTED
HMPV RNA NPH QL NAA+NON-PROBE: NOT DETECTED
HPIV1 RNA ISLT QL NAA+PROBE: NOT DETECTED
HPIV2 RNA SPEC QL NAA+PROBE: NOT DETECTED
HPIV3 RNA NPH QL NAA+PROBE: NOT DETECTED
HPIV4 P GENE NPH QL NAA+PROBE: NOT DETECTED
M PNEUMO IGG SER IA-ACNC: NOT DETECTED
RHINOVIRUS RNA SPEC NAA+PROBE: NOT DETECTED
RSV RNA NPH QL NAA+NON-PROBE: NOT DETECTED
SARS-COV-2 RNA NPH QL NAA+NON-PROBE: NOT DETECTED

## 2022-12-27 PROCEDURE — 0202U NFCT DS 22 TRGT SARS-COV-2: CPT | Performed by: INTERNAL MEDICINE

## 2023-03-10 ENCOUNTER — TRANSCRIBE ORDERS (OUTPATIENT)
Dept: ADMINISTRATIVE | Facility: HOSPITAL | Age: 83
End: 2023-03-10
Payer: MEDICARE

## 2023-03-10 DIAGNOSIS — R07.9 CHEST PAIN, UNSPECIFIED TYPE: Primary | ICD-10-CM

## 2023-04-05 ENCOUNTER — HOSPITAL ENCOUNTER (OUTPATIENT)
Dept: NUCLEAR MEDICINE | Facility: HOSPITAL | Age: 83
Discharge: HOME | End: 2023-04-05
Payer: MEDICARE

## 2023-04-05 DIAGNOSIS — R07.9 CHEST PAIN, UNSPECIFIED TYPE: ICD-10-CM

## 2023-04-05 LAB
BH CV REST NUCLEAR ISOTOPE DOSE: 10.3 MCI
BH CV STRESS COMMENTS STAGE 1: NORMAL
BH CV STRESS DOSE REGADENOSON STAGE 1: 0.4
BH CV STRESS DURATION MIN STAGE 1: 0
BH CV STRESS DURATION SEC STAGE 1: 10
BH CV STRESS NUCLEAR ISOTOPE DOSE: 36 MCI
BH CV STRESS PROTOCOL 1: NORMAL
BH CV STRESS RECOVERY BP: NORMAL MMHG
BH CV STRESS RECOVERY HR: 71 BPM
BH CV STRESS STAGE 1: 1
LV EF NUC BP: 75 %
MAXIMAL PREDICTED HEART RATE: 138 BPM
PERCENT MAX PREDICTED HR: 61.59 %
STRESS BASELINE BP: NORMAL MMHG
STRESS BASELINE HR: 56 BPM
STRESS PERCENT HR: 72 %
STRESS POST PEAK BP: NORMAL MMHG
STRESS POST PEAK HR: 85 BPM
STRESS TARGET HR: 117 BPM

## 2023-04-05 PROCEDURE — 93018 CV STRESS TEST I&R ONLY: CPT | Performed by: INTERNAL MEDICINE

## 2023-04-05 PROCEDURE — 93016 CV STRESS TEST SUPVJ ONLY: CPT | Performed by: INTERNAL MEDICINE

## 2023-04-05 PROCEDURE — 78452 HT MUSCLE IMAGE SPECT MULT: CPT

## 2023-04-05 PROCEDURE — 78452 HT MUSCLE IMAGE SPECT MULT: CPT | Performed by: INTERNAL MEDICINE

## 2023-04-05 PROCEDURE — 0 TECHNETIUM SESTAMIBI: Performed by: INTERNAL MEDICINE

## 2023-04-05 PROCEDURE — A9500 TC99M SESTAMIBI: HCPCS | Performed by: INTERNAL MEDICINE

## 2023-04-05 PROCEDURE — 93017 CV STRESS TEST TRACING ONLY: CPT

## 2023-04-05 RX ADMIN — TECHNETIUM TC 99M SESTAMIBI 1 DOSE: 1 INJECTION INTRAVENOUS at 07:06

## 2023-09-13 ENCOUNTER — HOSPITAL ENCOUNTER (OUTPATIENT)
Dept: PHYSICAL THERAPY | Facility: HOSPITAL | Age: 83
Setting detail: THERAPIES SERIES
Discharge: HOME | End: 2023-09-13
Payer: MEDICARE

## 2023-09-13 ENCOUNTER — TRANSCRIBE ORDERS (OUTPATIENT)
Dept: PHYSICAL THERAPY | Facility: HOSPITAL | Age: 83
End: 2023-09-13
Payer: MEDICARE

## 2023-09-13 DIAGNOSIS — H81.10 BENIGN PAROXYSMAL POSITIONAL VERTIGO, UNSPECIFIED LATERALITY: ICD-10-CM

## 2023-09-13 DIAGNOSIS — R42 DIZZINESS: Primary | ICD-10-CM

## 2023-09-13 DIAGNOSIS — R42 VERTIGO: Primary | ICD-10-CM

## 2023-09-13 PROCEDURE — 95992 CANALITH REPOSITIONING PROC: CPT

## 2023-09-13 PROCEDURE — 97161 PT EVAL LOW COMPLEX 20 MIN: CPT

## 2023-09-26 ENCOUNTER — HOSPITAL ENCOUNTER (OUTPATIENT)
Dept: PHYSICAL THERAPY | Facility: HOSPITAL | Age: 83
Setting detail: THERAPIES SERIES
Discharge: HOME | End: 2023-09-26
Payer: MEDICARE

## 2023-09-26 DIAGNOSIS — H81.10 BENIGN PAROXYSMAL POSITIONAL VERTIGO, UNSPECIFIED LATERALITY: ICD-10-CM

## 2023-09-26 DIAGNOSIS — R42 DIZZINESS: Primary | ICD-10-CM

## 2023-09-26 PROCEDURE — 97535 SELF CARE MNGMENT TRAINING: CPT

## 2024-01-09 ENCOUNTER — TRANSCRIBE ORDERS (OUTPATIENT)
Dept: ADMINISTRATIVE | Facility: HOSPITAL | Age: 84
End: 2024-01-09
Payer: MEDICARE

## 2024-01-09 ENCOUNTER — LAB (OUTPATIENT)
Dept: LAB | Facility: HOSPITAL | Age: 84
End: 2024-01-09
Payer: MEDICARE

## 2024-01-09 DIAGNOSIS — R53.83 OTHER FATIGUE: ICD-10-CM

## 2024-01-09 DIAGNOSIS — R53.83 OTHER FATIGUE: Primary | ICD-10-CM

## 2024-01-09 LAB
ALBUMIN SERPL-MCNC: 4.2 G/DL (ref 3.5–5.2)
ALBUMIN/GLOB SERPL: 1.8 G/DL
ALP SERPL-CCNC: 63 U/L (ref 39–117)
ALT SERPL W P-5'-P-CCNC: 18 U/L (ref 1–33)
ANION GAP SERPL CALCULATED.3IONS-SCNC: 12.5 MMOL/L (ref 5–15)
AST SERPL-CCNC: 22 U/L (ref 1–32)
BASOPHILS # BLD AUTO: 0.05 10*3/MM3 (ref 0–0.2)
BASOPHILS NFR BLD AUTO: 0.8 % (ref 0–1.5)
BILIRUB SERPL-MCNC: <0.2 MG/DL (ref 0–1.2)
BUN SERPL-MCNC: 18 MG/DL (ref 8–23)
BUN/CREAT SERPL: 22.2 (ref 7–25)
CALCIUM SPEC-SCNC: 9.2 MG/DL (ref 8.6–10.5)
CHLORIDE SERPL-SCNC: 101 MMOL/L (ref 98–107)
CO2 SERPL-SCNC: 23.5 MMOL/L (ref 22–29)
CREAT SERPL-MCNC: 0.81 MG/DL (ref 0.57–1)
DEPRECATED RDW RBC AUTO: 43.1 FL (ref 37–54)
EGFRCR SERPLBLD CKD-EPI 2021: 72.1 ML/MIN/1.73
EOSINOPHIL # BLD AUTO: 0.1 10*3/MM3 (ref 0–0.4)
EOSINOPHIL NFR BLD AUTO: 1.5 % (ref 0.3–6.2)
ERYTHROCYTE [DISTWIDTH] IN BLOOD BY AUTOMATED COUNT: 13 % (ref 12.3–15.4)
GLOBULIN UR ELPH-MCNC: 2.4 GM/DL
GLUCOSE SERPL-MCNC: 95 MG/DL (ref 65–99)
HCT VFR BLD AUTO: 42.9 % (ref 34–46.6)
HGB BLD-MCNC: 14.4 G/DL (ref 12–15.9)
IMM GRANULOCYTES # BLD AUTO: 0.01 10*3/MM3 (ref 0–0.05)
IMM GRANULOCYTES NFR BLD AUTO: 0.2 % (ref 0–0.5)
LYMPHOCYTES # BLD AUTO: 1.81 10*3/MM3 (ref 0.7–3.1)
LYMPHOCYTES NFR BLD AUTO: 28 % (ref 19.6–45.3)
MCH RBC QN AUTO: 30.7 PG (ref 26.6–33)
MCHC RBC AUTO-ENTMCNC: 33.6 G/DL (ref 31.5–35.7)
MCV RBC AUTO: 91.5 FL (ref 79–97)
MONOCYTES # BLD AUTO: 0.55 10*3/MM3 (ref 0.1–0.9)
MONOCYTES NFR BLD AUTO: 8.5 % (ref 5–12)
NEUTROPHILS NFR BLD AUTO: 3.94 10*3/MM3 (ref 1.7–7)
NEUTROPHILS NFR BLD AUTO: 61 % (ref 42.7–76)
NRBC BLD AUTO-RTO: 0 /100 WBC (ref 0–0.2)
PLATELET # BLD AUTO: 250 10*3/MM3 (ref 140–450)
PMV BLD AUTO: 8.9 FL (ref 6–12)
POTASSIUM SERPL-SCNC: 4.2 MMOL/L (ref 3.5–5.2)
PROT SERPL-MCNC: 6.6 G/DL (ref 6–8.5)
RBC # BLD AUTO: 4.69 10*6/MM3 (ref 3.77–5.28)
SODIUM SERPL-SCNC: 137 MMOL/L (ref 136–145)
T4 FREE SERPL-MCNC: 1.05 NG/DL (ref 0.93–1.7)
TSH SERPL DL<=0.05 MIU/L-ACNC: 1.81 UIU/ML (ref 0.27–4.2)
WBC NRBC COR # BLD AUTO: 6.46 10*3/MM3 (ref 3.4–10.8)

## 2024-01-09 PROCEDURE — 84443 ASSAY THYROID STIM HORMONE: CPT

## 2024-01-09 PROCEDURE — 36415 COLL VENOUS BLD VENIPUNCTURE: CPT

## 2024-01-09 PROCEDURE — 84439 ASSAY OF FREE THYROXINE: CPT

## 2024-01-09 PROCEDURE — 85025 COMPLETE CBC W/AUTO DIFF WBC: CPT

## 2024-01-09 PROCEDURE — 80053 COMPREHEN METABOLIC PANEL: CPT

## 2024-02-09 ENCOUNTER — HOSPITAL ENCOUNTER (OUTPATIENT)
Dept: GENERAL RADIOLOGY | Facility: HOSPITAL | Age: 84
Discharge: HOME | End: 2024-02-09
Payer: MEDICARE

## 2024-02-09 ENCOUNTER — LAB (OUTPATIENT)
Dept: LAB | Facility: HOSPITAL | Age: 84
End: 2024-02-09
Payer: MEDICARE

## 2024-02-09 ENCOUNTER — TRANSCRIBE ORDERS (OUTPATIENT)
Dept: ADMINISTRATIVE | Facility: HOSPITAL | Age: 84
End: 2024-02-09
Payer: MEDICARE

## 2024-02-09 DIAGNOSIS — M54.50 LOW BACK PAIN, UNSPECIFIED BACK PAIN LATERALITY, UNSPECIFIED CHRONICITY, UNSPECIFIED WHETHER SCIATICA PRESENT: ICD-10-CM

## 2024-02-09 DIAGNOSIS — R31.29 MICROSCOPIC HEMATURIA: Primary | ICD-10-CM

## 2024-02-09 DIAGNOSIS — R31.29 MICROSCOPIC HEMATURIA: ICD-10-CM

## 2024-02-09 PROCEDURE — 72170 X-RAY EXAM OF PELVIS: CPT

## 2024-02-09 PROCEDURE — 72110 X-RAY EXAM L-2 SPINE 4/>VWS: CPT

## 2024-02-09 PROCEDURE — 87086 URINE CULTURE/COLONY COUNT: CPT

## 2024-02-10 LAB — BACTERIA SPEC AEROBE CULT: NORMAL

## 2024-02-12 ENCOUNTER — TRANSCRIBE ORDERS (OUTPATIENT)
Dept: ADMINISTRATIVE | Facility: HOSPITAL | Age: 84
End: 2024-02-12
Payer: MEDICARE

## 2024-02-12 DIAGNOSIS — R31.29 MICROSCOPIC HEMATURIA: Primary | ICD-10-CM

## 2024-02-27 ENCOUNTER — HOSPITAL ENCOUNTER (OUTPATIENT)
Dept: CT IMAGING | Facility: HOSPITAL | Age: 84
Discharge: HOME | End: 2024-02-27
Admitting: INTERNAL MEDICINE
Payer: MEDICARE

## 2024-02-27 DIAGNOSIS — R31.29 MICROSCOPIC HEMATURIA: ICD-10-CM

## 2024-02-27 LAB — CREAT BLDA-MCNC: 0.9 MG/DL (ref 0.6–1.3)

## 2024-02-27 PROCEDURE — 74178 CT ABD&PLV WO CNTR FLWD CNTR: CPT

## 2024-02-27 PROCEDURE — 25510000001 IOPAMIDOL 61 % SOLUTION: Performed by: INTERNAL MEDICINE

## 2024-02-27 PROCEDURE — 82565 ASSAY OF CREATININE: CPT

## 2024-02-27 RX ADMIN — IOPAMIDOL 85 ML: 612 INJECTION, SOLUTION INTRAVENOUS at 15:21

## 2024-03-01 ENCOUNTER — HOSPITAL ENCOUNTER (OUTPATIENT)
Dept: GENERAL RADIOLOGY | Facility: HOSPITAL | Age: 84
Discharge: HOME | End: 2024-03-01
Payer: MEDICARE

## 2024-03-01 ENCOUNTER — TRANSCRIBE ORDERS (OUTPATIENT)
Dept: ADMINISTRATIVE | Facility: HOSPITAL | Age: 84
End: 2024-03-01
Payer: MEDICARE

## 2024-03-01 ENCOUNTER — LAB (OUTPATIENT)
Dept: LAB | Facility: HOSPITAL | Age: 84
End: 2024-03-01
Payer: MEDICARE

## 2024-03-01 DIAGNOSIS — R30.0 DYSURIA: ICD-10-CM

## 2024-03-01 DIAGNOSIS — R30.0 DYSURIA: Primary | ICD-10-CM

## 2024-03-01 DIAGNOSIS — M54.50 LOW BACK PAIN, UNSPECIFIED BACK PAIN LATERALITY, UNSPECIFIED CHRONICITY, UNSPECIFIED WHETHER SCIATICA PRESENT: ICD-10-CM

## 2024-03-01 LAB
BILIRUB UR QL STRIP: NEGATIVE
CLARITY UR: CLEAR
COLOR UR: ABNORMAL
GLUCOSE UR STRIP-MCNC: NEGATIVE MG/DL
HGB UR QL STRIP.AUTO: NEGATIVE
HOLD SPECIMEN: NORMAL
KETONES UR QL STRIP: NEGATIVE
LEUKOCYTE ESTERASE UR QL STRIP.AUTO: NEGATIVE
NITRITE UR QL STRIP: NEGATIVE
PH UR STRIP.AUTO: 5.5 [PH] (ref 5–8)
PROT UR QL STRIP: NEGATIVE
SP GR UR STRIP: 1.02 (ref 1–1.03)
UROBILINOGEN UR QL STRIP: ABNORMAL

## 2024-03-01 PROCEDURE — 87086 URINE CULTURE/COLONY COUNT: CPT

## 2024-03-01 PROCEDURE — 81003 URINALYSIS AUTO W/O SCOPE: CPT

## 2024-03-01 PROCEDURE — 72110 X-RAY EXAM L-2 SPINE 4/>VWS: CPT

## 2024-03-02 LAB — BACTERIA SPEC AEROBE CULT: NORMAL

## 2024-07-11 ENCOUNTER — TRANSCRIBE ORDERS (OUTPATIENT)
Dept: ADMINISTRATIVE | Facility: HOSPITAL | Age: 84
End: 2024-07-11
Payer: MEDICARE

## 2024-07-11 ENCOUNTER — HOSPITAL ENCOUNTER (OUTPATIENT)
Dept: GENERAL RADIOLOGY | Facility: HOSPITAL | Age: 84
Discharge: HOME | End: 2024-07-11
Admitting: INTERNAL MEDICINE
Payer: MEDICARE

## 2024-07-11 DIAGNOSIS — M25.561 PAIN IN JOINT OF RIGHT KNEE: ICD-10-CM

## 2024-07-11 DIAGNOSIS — S09.90XA HEAD TRAUMA, INITIAL ENCOUNTER: Primary | ICD-10-CM

## 2024-07-11 PROCEDURE — 73562 X-RAY EXAM OF KNEE 3: CPT

## 2024-07-12 ENCOUNTER — HOSPITAL ENCOUNTER (OUTPATIENT)
Dept: CT IMAGING | Facility: HOSPITAL | Age: 84
Discharge: HOME | End: 2024-07-12
Payer: MEDICARE

## 2024-07-12 DIAGNOSIS — S09.90XA HEAD TRAUMA, INITIAL ENCOUNTER: ICD-10-CM

## 2024-07-12 PROCEDURE — 70450 CT HEAD/BRAIN W/O DYE: CPT

## 2024-07-12 PROCEDURE — 70486 CT MAXILLOFACIAL W/O DYE: CPT

## 2024-07-12 PROCEDURE — 72125 CT NECK SPINE W/O DYE: CPT

## 2024-10-01 ENCOUNTER — TRANSCRIBE ORDERS (OUTPATIENT)
Dept: ADMINISTRATIVE | Facility: HOSPITAL | Age: 84
End: 2024-10-01
Payer: MEDICARE

## 2024-10-01 ENCOUNTER — LAB (OUTPATIENT)
Dept: LAB | Facility: HOSPITAL | Age: 84
End: 2024-10-01
Payer: MEDICARE

## 2024-10-01 DIAGNOSIS — E55.9 VITAMIN D DEFICIENCY, UNSPECIFIED: ICD-10-CM

## 2024-10-01 DIAGNOSIS — F32.A DEPRESSION, UNSPECIFIED DEPRESSION TYPE: Primary | ICD-10-CM

## 2024-10-01 DIAGNOSIS — F32.A DEPRESSION, UNSPECIFIED DEPRESSION TYPE: ICD-10-CM

## 2024-10-01 LAB
25(OH)D3 SERPL-MCNC: 31.2 NG/ML (ref 30–100)
ALBUMIN SERPL-MCNC: 4.2 G/DL (ref 3.5–5.2)
ALBUMIN/GLOB SERPL: 1.7 G/DL
ALP SERPL-CCNC: 68 U/L (ref 39–117)
ALT SERPL W P-5'-P-CCNC: 17 U/L (ref 1–33)
ANION GAP SERPL CALCULATED.3IONS-SCNC: 11 MMOL/L (ref 5–15)
AST SERPL-CCNC: 21 U/L (ref 1–32)
BASOPHILS # BLD AUTO: 0.04 10*3/MM3 (ref 0–0.2)
BASOPHILS NFR BLD AUTO: 0.8 % (ref 0–1.5)
BILIRUB SERPL-MCNC: 0.2 MG/DL (ref 0–1.2)
BUN SERPL-MCNC: 20 MG/DL (ref 8–23)
BUN/CREAT SERPL: 27 (ref 7–25)
CALCIUM SPEC-SCNC: 9.6 MG/DL (ref 8.6–10.5)
CHLORIDE SERPL-SCNC: 104 MMOL/L (ref 98–107)
CO2 SERPL-SCNC: 25 MMOL/L (ref 22–29)
CREAT SERPL-MCNC: 0.74 MG/DL (ref 0.57–1)
DEPRECATED RDW RBC AUTO: 43.8 FL (ref 37–54)
EGFRCR SERPLBLD CKD-EPI 2021: 80.4 ML/MIN/1.73
EOSINOPHIL # BLD AUTO: 0.06 10*3/MM3 (ref 0–0.4)
EOSINOPHIL NFR BLD AUTO: 1.1 % (ref 0.3–6.2)
ERYTHROCYTE [DISTWIDTH] IN BLOOD BY AUTOMATED COUNT: 12.7 % (ref 12.3–15.4)
FOLATE SERPL-MCNC: >20 NG/ML (ref 4.78–24.2)
GLOBULIN UR ELPH-MCNC: 2.5 GM/DL
GLUCOSE SERPL-MCNC: 97 MG/DL (ref 65–99)
HCT VFR BLD AUTO: 42.9 % (ref 34–46.6)
HGB BLD-MCNC: 14.3 G/DL (ref 12–15.9)
IMM GRANULOCYTES # BLD AUTO: 0.01 10*3/MM3 (ref 0–0.05)
IMM GRANULOCYTES NFR BLD AUTO: 0.2 % (ref 0–0.5)
LYMPHOCYTES # BLD AUTO: 1.36 10*3/MM3 (ref 0.7–3.1)
LYMPHOCYTES NFR BLD AUTO: 26 % (ref 19.6–45.3)
MCH RBC QN AUTO: 31.2 PG (ref 26.6–33)
MCHC RBC AUTO-ENTMCNC: 33.3 G/DL (ref 31.5–35.7)
MCV RBC AUTO: 93.7 FL (ref 79–97)
MONOCYTES # BLD AUTO: 0.55 10*3/MM3 (ref 0.1–0.9)
MONOCYTES NFR BLD AUTO: 10.5 % (ref 5–12)
NEUTROPHILS NFR BLD AUTO: 3.22 10*3/MM3 (ref 1.7–7)
NEUTROPHILS NFR BLD AUTO: 61.4 % (ref 42.7–76)
NRBC BLD AUTO-RTO: 0 /100 WBC (ref 0–0.2)
PLATELET # BLD AUTO: 225 10*3/MM3 (ref 140–450)
PMV BLD AUTO: 8.7 FL (ref 6–12)
POTASSIUM SERPL-SCNC: 4.5 MMOL/L (ref 3.5–5.2)
PROT SERPL-MCNC: 6.7 G/DL (ref 6–8.5)
RBC # BLD AUTO: 4.58 10*6/MM3 (ref 3.77–5.28)
SODIUM SERPL-SCNC: 140 MMOL/L (ref 136–145)
T4 FREE SERPL-MCNC: 1.12 NG/DL (ref 0.92–1.68)
TSH SERPL DL<=0.05 MIU/L-ACNC: 1.4 UIU/ML (ref 0.27–4.2)
VIT B12 BLD-MCNC: 1226 PG/ML (ref 211–946)
WBC NRBC COR # BLD AUTO: 5.24 10*3/MM3 (ref 3.4–10.8)

## 2024-10-01 PROCEDURE — 84443 ASSAY THYROID STIM HORMONE: CPT

## 2024-10-01 PROCEDURE — 80053 COMPREHEN METABOLIC PANEL: CPT

## 2024-10-01 PROCEDURE — 85025 COMPLETE CBC W/AUTO DIFF WBC: CPT

## 2024-10-01 PROCEDURE — 84439 ASSAY OF FREE THYROXINE: CPT

## 2024-10-01 PROCEDURE — 82746 ASSAY OF FOLIC ACID SERUM: CPT

## 2024-10-01 PROCEDURE — 82306 VITAMIN D 25 HYDROXY: CPT

## 2024-10-01 PROCEDURE — 82607 VITAMIN B-12: CPT

## 2024-10-01 PROCEDURE — 36415 COLL VENOUS BLD VENIPUNCTURE: CPT

## 2024-10-04 ENCOUNTER — TRANSCRIBE ORDERS (OUTPATIENT)
Dept: PHYSICAL THERAPY | Facility: HOSPITAL | Age: 84
End: 2024-10-04
Payer: MEDICARE

## 2024-10-04 DIAGNOSIS — H81.10 BENIGN PAROXYSMAL VERTIGO, UNSPECIFIED LATERALITY: Primary | ICD-10-CM

## 2024-10-25 ENCOUNTER — HOSPITAL ENCOUNTER (OUTPATIENT)
Dept: PHYSICAL THERAPY | Facility: HOSPITAL | Age: 84
Setting detail: THERAPIES SERIES
Discharge: HOME | End: 2024-10-25
Payer: MEDICARE

## 2024-10-25 DIAGNOSIS — R42 DIZZINESS: Primary | ICD-10-CM

## 2024-10-25 DIAGNOSIS — R26.89 BALANCE PROBLEM: ICD-10-CM

## 2024-10-25 PROCEDURE — 97530 THERAPEUTIC ACTIVITIES: CPT

## 2024-10-25 PROCEDURE — 97161 PT EVAL LOW COMPLEX 20 MIN: CPT

## 2024-11-14 ENCOUNTER — HOSPITAL ENCOUNTER (OUTPATIENT)
Dept: GENERAL RADIOLOGY | Facility: HOSPITAL | Age: 84
Discharge: HOME | End: 2024-11-14
Payer: MEDICARE

## 2024-11-14 DIAGNOSIS — M79.642 LEFT HAND PAIN: ICD-10-CM

## 2024-11-14 PROCEDURE — 73110 X-RAY EXAM OF WRIST: CPT

## 2024-11-14 PROCEDURE — 73130 X-RAY EXAM OF HAND: CPT

## 2024-12-30 ENCOUNTER — TRANSCRIBE ORDERS (OUTPATIENT)
Dept: LAB | Facility: HOSPITAL | Age: 84
End: 2024-12-30
Payer: MEDICARE

## 2024-12-30 ENCOUNTER — LAB (OUTPATIENT)
Dept: LAB | Facility: HOSPITAL | Age: 84
End: 2024-12-30
Payer: MEDICARE

## 2024-12-30 DIAGNOSIS — J84.178 OTHER INTERSTITIAL PULMONARY DISEASES WITH FIBROSIS IN DISEASES CLASSIFIED ELSEWHERE: ICD-10-CM

## 2024-12-30 DIAGNOSIS — J20.9 ACUTE BRONCHITIS, UNSPECIFIED ORGANISM: ICD-10-CM

## 2024-12-30 DIAGNOSIS — J20.9 ACUTE BRONCHITIS, UNSPECIFIED ORGANISM: Primary | ICD-10-CM

## 2024-12-30 LAB
B PARAPERT DNA SPEC QL NAA+PROBE: NOT DETECTED
B PERT DNA SPEC QL NAA+PROBE: NOT DETECTED
C PNEUM DNA NPH QL NAA+NON-PROBE: NOT DETECTED
FLUAV SUBTYP SPEC NAA+PROBE: NOT DETECTED
FLUBV RNA ISLT QL NAA+PROBE: NOT DETECTED
HADV DNA SPEC NAA+PROBE: NOT DETECTED
HCOV 229E RNA SPEC QL NAA+PROBE: NOT DETECTED
HCOV HKU1 RNA SPEC QL NAA+PROBE: NOT DETECTED
HCOV NL63 RNA SPEC QL NAA+PROBE: NOT DETECTED
HCOV OC43 RNA SPEC QL NAA+PROBE: NOT DETECTED
HMPV RNA NPH QL NAA+NON-PROBE: NOT DETECTED
HPIV1 RNA ISLT QL NAA+PROBE: NOT DETECTED
HPIV2 RNA SPEC QL NAA+PROBE: NOT DETECTED
HPIV3 RNA NPH QL NAA+PROBE: NOT DETECTED
HPIV4 P GENE NPH QL NAA+PROBE: NOT DETECTED
M PNEUMO IGG SER IA-ACNC: NOT DETECTED
RHINOVIRUS RNA SPEC NAA+PROBE: NOT DETECTED
RSV RNA NPH QL NAA+NON-PROBE: NOT DETECTED
SARS-COV-2 RNA NPH QL NAA+NON-PROBE: DETECTED

## 2024-12-30 PROCEDURE — 0202U NFCT DS 22 TRGT SARS-COV-2: CPT

## 2025-01-09 ENCOUNTER — HOSPITAL ENCOUNTER (OUTPATIENT)
Dept: PHYSICAL THERAPY | Facility: HOSPITAL | Age: 85
Setting detail: THERAPIES SERIES
Discharge: HOME | End: 2025-01-09
Payer: MEDICARE

## 2025-01-09 DIAGNOSIS — H81.10 BENIGN PAROXYSMAL POSITIONAL VERTIGO, UNSPECIFIED LATERALITY: ICD-10-CM

## 2025-01-09 DIAGNOSIS — R42 DIZZINESS: Primary | ICD-10-CM

## 2025-01-09 DIAGNOSIS — R26.89 BALANCE PROBLEM: ICD-10-CM

## 2025-01-09 PROCEDURE — 97164 PT RE-EVAL EST PLAN CARE: CPT

## 2025-01-09 PROCEDURE — 95992 CANALITH REPOSITIONING PROC: CPT

## 2025-01-09 NOTE — THERAPY RE-EVALUATION
Outpatient Physical Therapy Vestibular Re-Evaluation  [facility]     Patient Name: Kaycee Kaufman  : 1940  MRN: [id number]  Today's Date: 2025      Visit Date: 2025    Patient Active Problem List   Diagnosis    Abnormal weight gain    Fatigue    Lymphocytic thyroiditis    Hypothyroidism    Thyroiditis    Vitamin D deficiency        Past Medical History:   Diagnosis Date    Breast cyst     Hypothyroidism         Past Surgical History:   Procedure Laterality Date    BREAST CYST ASPIRATION      BREAST CYST EXCISION      SKIN SURGERY           Visit Dx:     ICD-10-CM ICD-9-CM   1. Dizziness  R42 780.4   2. Balance problem  R26.89 781.99   3. Benign paroxysmal positional vertigo, unspecified laterality  H81.10 386.11        Patient History       Row Name 25 1500             History    Chief Complaint Dizziness  -MP      Date Current Problem(s) Began 24  -MP      Brief Description of Current Complaint Pt. Presents to clinic for re-evaluation of vertigo. She was previously treated in clinic for horizontal canalithiasis and R PC BPPV. Was treated most recently for R PC BPPV in  and recent evaluation 10/2024 with (-) BPPV screen and reports resolution of symptoms until most current episode. She just recently recovered from COVID 2024 and has had symptoms of vertigo since which she felt may have been related to laying down more frequently. Symptoms are provoked when she goes to lay down on her R side, when she resumed her gentle exercises she felt symptoms when going to lay down and roll onto the R side. She will feel room spinning sensation and associated nausea, symptoms last 20-30 seconds.  -MP      Previous treatment for THIS PROBLEM Other (comment)  Physical Therapy  -MP      Patient/Caregiver Goals Relief from dizziness  -MP      Occupation/sports/leisure activities enjoys working out  -MP      Patient seeing anyone else for problem(s)? PCP  -MP         Pain     Pain at  Present 0  -MP      Pain at Best 0  -MP      Pain at Worst 0  -MP      What position do you sleep in? Right sidelying  -MP      Difficulties with ADL's? rolling or or laying on R side  -MP         Fall Risk Assessment    Any falls in the past year: Yes  -MP      Number of falls reported in the last 12 months 5-6  -MP      Factors that contributed to the fall: Lost balance  1 this AM  -MP         Services    Prior Rehab/Home Health Experiences No  -MP         Daily Activities    Primary Language English  -MP      Are you able to read Yes  -MP      Are you able to write Yes  -MP      How does patient learn best? Listening;Reading;Demonstration  -MP      Does patient have problems with the following? None  -MP      Barriers to learning None  -MP      Pt Participated in POC and Goals Yes  -MP         Safety    Are you being hurt, hit, or frightened by anyone at home or in your life? No  -MP      Are you being neglected by a caregiver No  -MP      Have you had any of the following issues with N/A  -MP                User Key  (r) = Recorded By, (t) = Taken By, (c) = Cosigned By      Initials Name Provider Type    Kitty Argueta PT Physical Therapist                     Vestibular Eval       Row Name 01/09/25 1500             Positional Testing    Positional Testing With infrared goggles  -MP      Vertebrobasilar Artery Screen - Right Negative  -MP      Vertebrobasilar Artery Screen - Left Negative  -MP      Pino-Hallpike Right Upbeat, right rotatory nystagmus  -MP      Pino-Hallpike Right Onset Time  6sec  -MP      Pino-Hallpike Right Duration Time  17sec  -MP                User Key  (r) = Recorded By, (t) = Taken By, (c) = Cosigned By      Initials Name Provider Type    Kitty Argueta PT Physical Therapist                                Therapy Education  Education Details: reviewed post-maneuver recommendations; anatomy of vestibular system  Given: Symptoms/condition management, Posture/body  mechanics  Program: Reinforced  How Provided: Verbal, Demonstration  Provided to: Patient  Level of Understanding: Verbalized, Demonstrated       OP Exercises       Row Name 01/09/25 1500             Subjective Pain    Able to rate subjective pain? yes  -MP      Pre-Treatment Pain Level 0  -MP      Post-Treatment Pain Level 0  -MP         Exercise 1    Exercise Name 1 epley/CRM  -MP      Cueing 1 Verbal  -MP      Sets 1 1  -MP      Reps 1 1  -MP      Time 1 R PC  -MP      Additional Comments (-) retest  -MP                User Key  (r) = Recorded By, (t) = Taken By, (c) = Cosigned By      Initials Name Provider Type    Kitty Argueta, PT Physical Therapist                                 PT OP Goals       Row Name 01/09/25 1500          PT Short Term Goals    STG Date to Achieve 02/08/25  -MP     STG 1 Pt. will be independent with initial HEP to improve self-management of condition.  -MP     STG 1 Progress Ongoing  -MP     STG 2 Pt. will tolerate initial VOR exercises without provocation to progress to dynamic challenges.  -MP     STG 2 Progress Ongoing  -MP     STG 3 Pt. will be (-) R PC BPPV to reduce symptoms with transitional movements.  -MP     STG 3 Progress New  -MP        Long Term Goals    LTG Date to Achieve 03/10/25  -MP     LTG 1 Pt. will be independent with advanced HEP to improve long term management of condition and independence.  -MP     LTG 1 Progress Ongoing  -MP     LTG 2 Pt will score </= 30/100 on DHI (from 60/100 on initial evaluation) to indicate improved perception of disability.  -MP     LTG 2 Progress Ongoing  -MP     LTG 3 Pt. will be independent with static fixation to reduce symptoms with transitional movements.  -MP     LTG 3 Progress Ongoing  -MP        Time Calculation    PT Goal Re-Cert Due Date 04/09/25  -MP               User Key  (r) = Recorded By, (t) = Taken By, (c) = Cosigned By      Initials Name Provider Type    Kitty Argueta PT Physical Therapist                      PT Assessment/Plan       Row Name 25 1500          PT Assessment    Functional Limitations Impaired locomotion;Decreased safety during functional activities;Limitation in home management  -MP     Impairments Balance  -MP     Assessment Comments Kaycee Kaufman is a 84 y.o. year-old female returning to clinic for re-evaluation of vertigo. Reports symptoms returned 2024 after COVID illness. Symptoms provoked with laying down or moving to R. Reassessed BPPV, (+) R Pino Hallpike for posterior canalithiasis. Treated with Epley/CRM with (-) retest. Pt. Remains appropriate for skilled PT.  -MP     Please refer to paper survey for additional self-reported information No  -MP     Rehab Potential Good  -MP     Patient/caregiver participated in establishment of treatment plan and goals Yes  -MP     Patient would benefit from skilled therapy intervention Yes  -MP        PT Plan    PT Frequency 1x/week  -MP     Predicted Duration of Therapy Intervention (PT) 6 visits  -MP     Planned CPT's? PT RE-EVAL: 77376;PT THER PROC EA 15 MIN: 94727;PT THER ACT EA 15 MIN: 64007;PT MANUAL THERAPY EA 15 MIN: 72937;PT NEUROMUSC RE-EDUCATION EA 15 MIN: 42994;PT GAIT TRAINING EA 15 MIN: 20304;PT SELF CARE/HOME MGMT/TRAIN EA 15: 60956;PT CANALITH REPOSITIONIN  -MP     PT Plan Comments vestibular - reassess BPPV  -MP               User Key  (r) = Recorded By, (t) = Taken By, (c) = Cosigned By      Initials Name Provider Type    MP Kitty Osullivan, PT Physical Therapist                               Time Calculation:   Start Time: 1510  Stop Time: 1535  Time Calculation (min): 25 min  Untimed Charges  PT Eval/Re-eval Minutes: 15  PT Canalith Repositioning: 10  Total Minutes  Untimed Charges Total Minutes: 25   Total Minutes: 25   Therapy Charges for Today       Code Description Service Date Service Provider Modifiers Qty    [id number] HC PT CANALITH REPOSITIONING PER DAY 2025 Kitty Osullivan, PT GP 1    [id number]  HC PT RE-SANTOSH ESTABLISHED PLAN 2 1/9/2025 Kitty Osullivan, PT GP 1                      Kitty Osullivan, PT  1/9/2025

## 2025-02-06 ENCOUNTER — HOSPITAL ENCOUNTER (OUTPATIENT)
Facility: HOSPITAL | Age: 85
Discharge: HOME | End: 2025-02-07
Attending: INTERNAL MEDICINE | Admitting: INTERNAL MEDICINE
Payer: MEDICARE

## 2025-02-06 PROBLEM — G45.4 TRANSIENT GLOBAL AMNESIA: Status: ACTIVE | Noted: 2025-02-06

## 2025-02-06 PROBLEM — R41.3 ACUTE MEMORY IMPAIRMENT: Status: ACTIVE | Noted: 2025-02-06

## 2025-02-06 LAB
ALBUMIN SERPL-MCNC: 4.1 G/DL (ref 3.5–5.2)
ALBUMIN/GLOB SERPL: 1.6 G/DL
ALP SERPL-CCNC: 62 U/L (ref 39–117)
ALT SERPL W P-5'-P-CCNC: 16 U/L (ref 1–33)
ANION GAP SERPL CALCULATED.3IONS-SCNC: 10 MMOL/L (ref 5–15)
AST SERPL-CCNC: 23 U/L (ref 1–32)
BASOPHILS # BLD AUTO: 0.04 10*3/MM3 (ref 0–0.2)
BASOPHILS NFR BLD AUTO: 0.6 % (ref 0–1.5)
BILIRUB SERPL-MCNC: 0.2 MG/DL (ref 0–1.2)
BUN SERPL-MCNC: 16 MG/DL (ref 8–23)
BUN/CREAT SERPL: 20.5 (ref 7–25)
CALCIUM SPEC-SCNC: 8.9 MG/DL (ref 8.6–10.5)
CHLORIDE SERPL-SCNC: 104 MMOL/L (ref 98–107)
CHOLEST SERPL-MCNC: 243 MG/DL (ref 0–200)
CO2 SERPL-SCNC: 24 MMOL/L (ref 22–29)
CREAT SERPL-MCNC: 0.78 MG/DL (ref 0.57–1)
DEPRECATED RDW RBC AUTO: 44.9 FL (ref 37–54)
EGFRCR SERPLBLD CKD-EPI 2021: 75 ML/MIN/1.73
EOSINOPHIL # BLD AUTO: 0.03 10*3/MM3 (ref 0–0.4)
EOSINOPHIL NFR BLD AUTO: 0.5 % (ref 0.3–6.2)
ERYTHROCYTE [DISTWIDTH] IN BLOOD BY AUTOMATED COUNT: 12.7 % (ref 12.3–15.4)
ETHANOL BLD-MCNC: <10 MG/DL (ref 0–10)
ETHANOL UR QL: <0.01 %
GLOBULIN UR ELPH-MCNC: 2.5 GM/DL
GLUCOSE SERPL-MCNC: 118 MG/DL (ref 65–99)
HBA1C MFR BLD: 5.5 % (ref 4.8–5.6)
HCT VFR BLD AUTO: 42.2 % (ref 34–46.6)
HDLC SERPL-MCNC: 59 MG/DL (ref 40–60)
HGB BLD-MCNC: 13.4 G/DL (ref 12–15.9)
IMM GRANULOCYTES # BLD AUTO: 0.02 10*3/MM3 (ref 0–0.05)
IMM GRANULOCYTES NFR BLD AUTO: 0.3 % (ref 0–0.5)
INR PPP: 1.07 (ref 0.9–1.1)
LDLC SERPL CALC-MCNC: 166 MG/DL (ref 0–100)
LDLC/HDLC SERPL: 2.77 {RATIO}
LYMPHOCYTES # BLD AUTO: 1.68 10*3/MM3 (ref 0.7–3.1)
LYMPHOCYTES NFR BLD AUTO: 26 % (ref 19.6–45.3)
MCH RBC QN AUTO: 30.3 PG (ref 26.6–33)
MCHC RBC AUTO-ENTMCNC: 31.8 G/DL (ref 31.5–35.7)
MCV RBC AUTO: 95.5 FL (ref 79–97)
MONOCYTES # BLD AUTO: 0.47 10*3/MM3 (ref 0.1–0.9)
MONOCYTES NFR BLD AUTO: 7.3 % (ref 5–12)
NEUTROPHILS NFR BLD AUTO: 4.23 10*3/MM3 (ref 1.7–7)
NEUTROPHILS NFR BLD AUTO: 65.3 % (ref 42.7–76)
NRBC BLD AUTO-RTO: 0 /100 WBC (ref 0–0.2)
PLATELET # BLD AUTO: 252 10*3/MM3 (ref 140–450)
PMV BLD AUTO: 9.2 FL (ref 6–12)
POTASSIUM SERPL-SCNC: 4 MMOL/L (ref 3.5–5.2)
PROT SERPL-MCNC: 6.6 G/DL (ref 6–8.5)
PROTHROMBIN TIME: 13.9 SECONDS (ref 11.7–14.2)
RBC # BLD AUTO: 4.42 10*6/MM3 (ref 3.77–5.28)
SODIUM SERPL-SCNC: 138 MMOL/L (ref 136–145)
TRIGL SERPL-MCNC: 103 MG/DL (ref 0–150)
TSH SERPL DL<=0.05 MIU/L-ACNC: 1.47 UIU/ML (ref 0.27–4.2)
VIT B12 BLD-MCNC: 1097 PG/ML (ref 211–946)
VLDLC SERPL-MCNC: 18 MG/DL (ref 5–40)
WBC NRBC COR # BLD AUTO: 6.47 10*3/MM3 (ref 3.4–10.8)

## 2025-02-06 PROCEDURE — 99203 OFFICE O/P NEW LOW 30 MIN: CPT

## 2025-02-06 PROCEDURE — 83036 HEMOGLOBIN GLYCOSYLATED A1C: CPT | Performed by: INTERNAL MEDICINE

## 2025-02-06 PROCEDURE — G0378 HOSPITAL OBSERVATION PER HR: HCPCS

## 2025-02-06 PROCEDURE — 82077 ASSAY SPEC XCP UR&BREATH IA: CPT

## 2025-02-06 PROCEDURE — 63710000001 ATORVASTATIN 80 MG TABLET: Performed by: INTERNAL MEDICINE

## 2025-02-06 PROCEDURE — A9270 NON-COVERED ITEM OR SERVICE: HCPCS | Performed by: INTERNAL MEDICINE

## 2025-02-06 PROCEDURE — 63710000001 ASPIRIN 325 MG TABLET: Performed by: INTERNAL MEDICINE

## 2025-02-06 PROCEDURE — 85025 COMPLETE CBC W/AUTO DIFF WBC: CPT | Performed by: INTERNAL MEDICINE

## 2025-02-06 PROCEDURE — 80307 DRUG TEST PRSMV CHEM ANLYZR: CPT

## 2025-02-06 PROCEDURE — 80061 LIPID PANEL: CPT | Performed by: INTERNAL MEDICINE

## 2025-02-06 PROCEDURE — 80053 COMPREHEN METABOLIC PANEL: CPT | Performed by: INTERNAL MEDICINE

## 2025-02-06 PROCEDURE — 82607 VITAMIN B-12: CPT | Performed by: INTERNAL MEDICINE

## 2025-02-06 PROCEDURE — 81001 URINALYSIS AUTO W/SCOPE: CPT

## 2025-02-06 PROCEDURE — 84443 ASSAY THYROID STIM HORMONE: CPT | Performed by: INTERNAL MEDICINE

## 2025-02-06 PROCEDURE — 85610 PROTHROMBIN TIME: CPT | Performed by: INTERNAL MEDICINE

## 2025-02-06 RX ORDER — POLYETHYLENE GLYCOL 3350 17 G/17G
17 POWDER, FOR SOLUTION ORAL DAILY PRN
Status: DISCONTINUED | OUTPATIENT
Start: 2025-02-06 | End: 2025-02-07 | Stop reason: HOSPADM

## 2025-02-06 RX ORDER — SODIUM CHLORIDE 0.9 % (FLUSH) 0.9 %
10 SYRINGE (ML) INJECTION EVERY 12 HOURS SCHEDULED
Status: DISCONTINUED | OUTPATIENT
Start: 2025-02-06 | End: 2025-02-07 | Stop reason: HOSPADM

## 2025-02-06 RX ORDER — AMOXICILLIN 250 MG
2 CAPSULE ORAL 2 TIMES DAILY PRN
Status: DISCONTINUED | OUTPATIENT
Start: 2025-02-06 | End: 2025-02-07 | Stop reason: HOSPADM

## 2025-02-06 RX ORDER — SODIUM CHLORIDE 9 MG/ML
100 INJECTION, SOLUTION INTRAVENOUS CONTINUOUS
Status: ACTIVE | OUTPATIENT
Start: 2025-02-06 | End: 2025-02-07

## 2025-02-06 RX ORDER — BISACODYL 10 MG
10 SUPPOSITORY, RECTAL RECTAL DAILY PRN
Status: DISCONTINUED | OUTPATIENT
Start: 2025-02-06 | End: 2025-02-07 | Stop reason: HOSPADM

## 2025-02-06 RX ORDER — SODIUM CHLORIDE 0.9 % (FLUSH) 0.9 %
10 SYRINGE (ML) INJECTION AS NEEDED
Status: DISCONTINUED | OUTPATIENT
Start: 2025-02-06 | End: 2025-02-07 | Stop reason: HOSPADM

## 2025-02-06 RX ORDER — ATORVASTATIN CALCIUM 80 MG/1
80 TABLET, FILM COATED ORAL NIGHTLY
Status: DISCONTINUED | OUTPATIENT
Start: 2025-02-06 | End: 2025-02-07 | Stop reason: HOSPADM

## 2025-02-06 RX ORDER — ASPIRIN 300 MG/1
300 SUPPOSITORY RECTAL DAILY
Status: DISCONTINUED | OUTPATIENT
Start: 2025-02-06 | End: 2025-02-07 | Stop reason: HOSPADM

## 2025-02-06 RX ORDER — ASPIRIN 325 MG
325 TABLET ORAL DAILY
Status: DISCONTINUED | OUTPATIENT
Start: 2025-02-06 | End: 2025-02-07 | Stop reason: HOSPADM

## 2025-02-06 RX ORDER — ONDANSETRON 2 MG/ML
4 INJECTION INTRAMUSCULAR; INTRAVENOUS EVERY 6 HOURS PRN
Status: DISCONTINUED | OUTPATIENT
Start: 2025-02-06 | End: 2025-02-07 | Stop reason: HOSPADM

## 2025-02-06 RX ORDER — BISACODYL 5 MG/1
5 TABLET, DELAYED RELEASE ORAL DAILY PRN
Status: DISCONTINUED | OUTPATIENT
Start: 2025-02-06 | End: 2025-02-07 | Stop reason: HOSPADM

## 2025-02-06 RX ORDER — ACETAMINOPHEN 325 MG/1
650 TABLET ORAL EVERY 4 HOURS PRN
Status: DISCONTINUED | OUTPATIENT
Start: 2025-02-06 | End: 2025-02-07 | Stop reason: HOSPADM

## 2025-02-06 RX ORDER — OXYBUTYNIN 3.9 MG/D
1 PATCH TRANSDERMAL 2 TIMES WEEKLY
COMMUNITY

## 2025-02-06 RX ORDER — ACETAMINOPHEN 650 MG/1
650 SUPPOSITORY RECTAL EVERY 4 HOURS PRN
Status: DISCONTINUED | OUTPATIENT
Start: 2025-02-06 | End: 2025-02-07 | Stop reason: HOSPADM

## 2025-02-06 RX ORDER — SODIUM CHLORIDE 9 MG/ML
40 INJECTION, SOLUTION INTRAVENOUS AS NEEDED
Status: DISCONTINUED | OUTPATIENT
Start: 2025-02-06 | End: 2025-02-07 | Stop reason: HOSPADM

## 2025-02-06 RX ORDER — ONDANSETRON 4 MG/1
4 TABLET, ORALLY DISINTEGRATING ORAL EVERY 6 HOURS PRN
Status: DISCONTINUED | OUTPATIENT
Start: 2025-02-06 | End: 2025-02-07 | Stop reason: HOSPADM

## 2025-02-06 RX ORDER — CALCIUM CARBONATE 500 MG/1
2 TABLET, CHEWABLE ORAL 3 TIMES DAILY PRN
Status: DISCONTINUED | OUTPATIENT
Start: 2025-02-06 | End: 2025-02-07 | Stop reason: HOSPADM

## 2025-02-06 RX ADMIN — ASPIRIN 325 MG ORAL TABLET 325 MG: 325 PILL ORAL at 21:38

## 2025-02-06 RX ADMIN — ATORVASTATIN CALCIUM 80 MG: 80 TABLET, FILM COATED ORAL at 21:38

## 2025-02-06 NOTE — H&P
Patient Name:  Kaycee Kaufman  YOB: 1940  MRN:  [id number]  Admit Date:  2025  Patient Care Team:  Reva Mcleod MD as PCP - General  Reva Mcleod MD as PCP - Family Medicine      Subjective   History Present Illness         Ms. Kaufman is a 84 y.o. non-smoker with a history of hypothyroidism from Hashimoto's Thyroiditis otherwise healthy that presents to [facility] complaining of severe forgetfulness and word-finding difficulties which started this AM. She states she woke up this morning and went to exercise as she normally does and could not find the program on her television that she normally has no difficulty finding. She then went to make her smoothie which she does almost every day and could not remember the steps to do this. She tried to make this anyway and the final product ended up tasting terrible. She talked to her granddaughter a little later and she noticed that the patient was forgetting entire portions of their conversation and she was asking repetitive questions.   Of note her ex-  about a week ago and the patient has been under significant stress dealing with this.    Review of Systems   All other systems reviewed and are negative.       Personal History     Past Medical History:   Diagnosis Date    Breast cyst     Hypothyroidism      Past Surgical History:   Procedure Laterality Date    BREAST CYST ASPIRATION      BREAST CYST EXCISION      SKIN SURGERY       Family History   Problem Relation Age of Onset    Breast cancer Paternal Aunt      Social History     Tobacco Use    Smoking status: Never    Smokeless tobacco: Never   Vaping Use    Vaping status: Never Used   Substance Use Topics    Alcohol use: Never    Drug use: Never     No current facility-administered medications on file prior to encounter.     Current Outpatient Medications on File Prior to Encounter   Medication Sig Dispense Refill    FLUoxetine (PROzac) 20 MG capsule Take  by  mouth.      levothyroxine (SYNTHROID, LEVOTHROID) 100 MCG tablet Take 1 tablet by mouth Daily.      levothyroxine (SYNTHROID, LEVOTHROID) 50 MCG tablet Take 1 tablet by mouth Daily.      oxybutynin (Oxytrol For Women) 3.9 MG/24HR Place 1 patch on the skin as directed by provider 2 (Two) Times a Week.      [DISCONTINUED] FLUZONE HIGH-DOSE 0.5 ML suspension prefilled syringe injection TO BE ADMINISTERED BY PHARMACIST FOR IMMUNIZATION  0    [DISCONTINUED] promethazine-dextromethorphan (PROMETHAZINE-DM) 6.25-15 MG/5ML solution Take 5 mL by mouth 4 (Four) Times a Day As Needed for Cough. 118 mL 0     No Known Allergies    Objective    Objective     Vital Signs  Temp:  [98.1 °F (36.7 °C)] 98.1 °F (36.7 °C)  Heart Rate:  [55] 55  Resp:  [18] 18  BP: (159)/(70) 159/70  SpO2:  [100 %] 100 %  on   ;   Device (Oxygen Therapy): room air  There is no height or weight on file to calculate BMI.    Physical Exam  Vitals and nursing note reviewed.   Constitutional:       General: She is not in acute distress.     Appearance: She is not ill-appearing, toxic-appearing or diaphoretic.   HENT:      Head: Normocephalic and atraumatic.      Nose: Nose normal.      Mouth/Throat:      Mouth: Mucous membranes are moist.      Pharynx: Oropharynx is clear.   Eyes:      Conjunctiva/sclera: Conjunctivae normal.      Pupils: Pupils are equal, round, and reactive to light.   Cardiovascular:      Rate and Rhythm: Normal rate and regular rhythm.      Pulses: Normal pulses.   Pulmonary:      Effort: Pulmonary effort is normal.      Breath sounds: Normal breath sounds.   Abdominal:      General: Bowel sounds are normal. There is no distension.      Palpations: Abdomen is soft.      Tenderness: There is no abdominal tenderness.   Musculoskeletal:         General: No swelling or tenderness.      Cervical back: Neck supple.   Skin:     General: Skin is warm and dry.      Capillary Refill: Capillary refill takes less than 2 seconds.   Neurological:       General: No focal deficit present.      Mental Status: She is alert and oriented to person, place, and time.   Psychiatric:         Mood and Affect: Mood normal.         Behavior: Behavior normal.       Results Review:  I reviewed the patient's new clinical results.  I reviewed the patient's new imaging results and agree with the interpretation.  I reviewed the patient's other test results and agree with the interpretation  I personally viewed and interpreted the patient's EKG/Telemetry data  Discussed with her primary care provider.    Lab Results (last 24 hours)       ** No results found for the last 24 hours. **            Imaging Results (Last 24 Hours)       ** No results found for the last 24 hours. **            Results for orders placed during the hospital encounter of 01/08/21    Adult Stress Echo W/ Cont or Stress Agent if Necessary Per Protocol    Interpretation Summary  · Normal stress echo with no significant echocardiographic evidence for myocardial ischemia consistent with a low risk study for myocardial ischemia.  · Blood pressure demonstrated a hypertensive response to stress.  · Left ventricular ejection fraction appears to be 61 - 65%.  · Left ventricular diastolic function is consistent with (grade I) impaired relaxation.  · The interatrial septum appears redundant. Saline test results are negative.  · Mild tricuspid valve regurgitation is present.  · Calculated right ventricular systolic pressure from tricuspid regurgitation is 27 mmHg.  · There is no evidence of pericardial effusion. .      Telemetry Scan   Final Result           Assessment/Plan     Active Hospital Problems    Diagnosis  POA    **Transient global amnesia [G45.4]  Yes    Acute memory impairment [R41.3]  Yes    Hypothyroidism [E03.9]  Yes      Resolved Hospital Problems   No resolved problems to display.   Acute Memory Impairment/Transient Global Amnesia  - I suspect that stressors from recent family events have contributed but  will of course evaluate for organic issues  - check TSH, B12  - check brain MRI and CTA head and neck  - start on asa, lipitor, IV fluids  - monitor on telemetry  - neurology consult    Hypothyroidism  - resume levothyroxine      I discussed the patient's findings and my recommendations with patient, family, nursing staff, and primary care provider.    VTE Prophylaxis - SCDs.  Code Status - Full code.       Rod Reese MD  [facility]  02/06/25  18:07 EST

## 2025-02-06 NOTE — PLAN OF CARE
Problem: Adult Inpatient Plan of Care  Goal: Plan of Care Review  Outcome: Progressing  Goal: Patient-Specific Goal (Individualized)  Outcome: Progressing  Goal: Absence of Hospital-Acquired Illness or Injury  Outcome: Progressing  Goal: Optimal Comfort and Wellbeing  Outcome: Progressing  Intervention: Provide Person-Centered Care  Recent Flowsheet Documentation  Taken 2/6/2025 1600 by Verena Magallanes RN  Trust Relationship/Rapport:   care explained   choices provided  Goal: Readiness for Transition of Care  Outcome: Progressing   Goal Outcome Evaluation:

## 2025-02-07 ENCOUNTER — APPOINTMENT (OUTPATIENT)
Dept: MRI IMAGING | Facility: HOSPITAL | Age: 85
End: 2025-02-07
Payer: MEDICARE

## 2025-02-07 ENCOUNTER — APPOINTMENT (OUTPATIENT)
Dept: CT IMAGING | Facility: HOSPITAL | Age: 85
End: 2025-02-07
Payer: MEDICARE

## 2025-02-07 ENCOUNTER — APPOINTMENT (OUTPATIENT)
Dept: NEUROLOGY | Facility: HOSPITAL | Age: 85
End: 2025-02-07
Payer: MEDICARE

## 2025-02-07 VITALS
OXYGEN SATURATION: 96 % | RESPIRATION RATE: 18 BRPM | WEIGHT: 155 LBS | TEMPERATURE: 98.1 F | SYSTOLIC BLOOD PRESSURE: 163 MMHG | HEART RATE: 51 BPM | HEIGHT: 65 IN | BODY MASS INDEX: 25.83 KG/M2 | DIASTOLIC BLOOD PRESSURE: 69 MMHG

## 2025-02-07 LAB
25(OH)D3 SERPL-MCNC: 34.8 NG/ML (ref 30–100)
AMPHET+METHAMPHET UR QL: NEGATIVE
AMPHETAMINES UR QL: NEGATIVE
ANION GAP SERPL CALCULATED.3IONS-SCNC: 11.7 MMOL/L (ref 5–15)
BACTERIA UR QL AUTO: NORMAL /HPF
BARBITURATES UR QL SCN: NEGATIVE
BASOPHILS # BLD AUTO: 0.03 10*3/MM3 (ref 0–0.2)
BASOPHILS NFR BLD AUTO: 0.5 % (ref 0–1.5)
BENZODIAZ UR QL SCN: NEGATIVE
BILIRUB UR QL STRIP: NEGATIVE
BUN SERPL-MCNC: 18 MG/DL (ref 8–23)
BUN/CREAT SERPL: 24.3 (ref 7–25)
BUPRENORPHINE SERPL-MCNC: NEGATIVE NG/ML
CALCIUM SPEC-SCNC: 9 MG/DL (ref 8.6–10.5)
CANNABINOIDS SERPL QL: NEGATIVE
CHLORIDE SERPL-SCNC: 106 MMOL/L (ref 98–107)
CLARITY UR: CLEAR
CO2 SERPL-SCNC: 23.3 MMOL/L (ref 22–29)
COCAINE UR QL: NEGATIVE
COD CRY URNS QL: PRESENT /HPF
COLOR UR: YELLOW
CREAT SERPL-MCNC: 0.74 MG/DL (ref 0.57–1)
DEPRECATED RDW RBC AUTO: 44.1 FL (ref 37–54)
EGFRCR SERPLBLD CKD-EPI 2021: 79.9 ML/MIN/1.73
EOSINOPHIL # BLD AUTO: 0.04 10*3/MM3 (ref 0–0.4)
EOSINOPHIL NFR BLD AUTO: 0.7 % (ref 0.3–6.2)
ERYTHROCYTE [DISTWIDTH] IN BLOOD BY AUTOMATED COUNT: 12.7 % (ref 12.3–15.4)
FENTANYL UR-MCNC: NEGATIVE NG/ML
FOLATE SERPL-MCNC: >20 NG/ML (ref 4.78–24.2)
GLUCOSE BLDC GLUCOMTR-MCNC: 104 MG/DL (ref 70–130)
GLUCOSE BLDC GLUCOMTR-MCNC: 106 MG/DL (ref 70–130)
GLUCOSE BLDC GLUCOMTR-MCNC: 113 MG/DL (ref 70–130)
GLUCOSE SERPL-MCNC: 106 MG/DL (ref 65–99)
GLUCOSE UR STRIP-MCNC: NEGATIVE MG/DL
HCT VFR BLD AUTO: 41.4 % (ref 34–46.6)
HGB BLD-MCNC: 13.4 G/DL (ref 12–15.9)
HGB UR QL STRIP.AUTO: NEGATIVE
HYALINE CASTS UR QL AUTO: NORMAL /LPF
IMM GRANULOCYTES # BLD AUTO: 0.01 10*3/MM3 (ref 0–0.05)
IMM GRANULOCYTES NFR BLD AUTO: 0.2 % (ref 0–0.5)
KETONES UR QL STRIP: NEGATIVE
LEUKOCYTE ESTERASE UR QL STRIP.AUTO: ABNORMAL
LYMPHOCYTES # BLD AUTO: 1.31 10*3/MM3 (ref 0.7–3.1)
LYMPHOCYTES NFR BLD AUTO: 23.7 % (ref 19.6–45.3)
MCH RBC QN AUTO: 30.9 PG (ref 26.6–33)
MCHC RBC AUTO-ENTMCNC: 32.4 G/DL (ref 31.5–35.7)
MCV RBC AUTO: 95.4 FL (ref 79–97)
METHADONE UR QL SCN: NEGATIVE
MONOCYTES # BLD AUTO: 0.51 10*3/MM3 (ref 0.1–0.9)
MONOCYTES NFR BLD AUTO: 9.2 % (ref 5–12)
NEUTROPHILS NFR BLD AUTO: 3.63 10*3/MM3 (ref 1.7–7)
NEUTROPHILS NFR BLD AUTO: 65.7 % (ref 42.7–76)
NITRITE UR QL STRIP: NEGATIVE
NRBC BLD AUTO-RTO: 0 /100 WBC (ref 0–0.2)
OPIATES UR QL: NEGATIVE
OXYCODONE UR QL SCN: NEGATIVE
PCP UR QL SCN: NEGATIVE
PH UR STRIP.AUTO: 5.5 [PH] (ref 5–8)
PLATELET # BLD AUTO: 240 10*3/MM3 (ref 140–450)
PMV BLD AUTO: 8.8 FL (ref 6–12)
POTASSIUM SERPL-SCNC: 4.1 MMOL/L (ref 3.5–5.2)
PROT UR QL STRIP: NEGATIVE
RBC # BLD AUTO: 4.34 10*6/MM3 (ref 3.77–5.28)
RBC # UR STRIP: NORMAL /HPF
REF LAB TEST METHOD: NORMAL
SODIUM SERPL-SCNC: 141 MMOL/L (ref 136–145)
SP GR UR STRIP: 1.01 (ref 1–1.03)
SQUAMOUS #/AREA URNS HPF: NORMAL /HPF
TRICYCLICS UR QL SCN: NEGATIVE
UROBILINOGEN UR QL STRIP: ABNORMAL
WBC # UR STRIP: NORMAL /HPF
WBC NRBC COR # BLD AUTO: 5.53 10*3/MM3 (ref 3.4–10.8)

## 2025-02-07 PROCEDURE — 99213 OFFICE O/P EST LOW 20 MIN: CPT | Performed by: STUDENT IN AN ORGANIZED HEALTH CARE EDUCATION/TRAINING PROGRAM

## 2025-02-07 PROCEDURE — 25810000003 SODIUM CHLORIDE 0.9 % SOLUTION: Performed by: INTERNAL MEDICINE

## 2025-02-07 PROCEDURE — A9577 INJ MULTIHANCE: HCPCS | Performed by: INTERNAL MEDICINE

## 2025-02-07 PROCEDURE — G0378 HOSPITAL OBSERVATION PER HR: HCPCS

## 2025-02-07 PROCEDURE — 95819 EEG AWAKE AND ASLEEP: CPT | Performed by: STUDENT IN AN ORGANIZED HEALTH CARE EDUCATION/TRAINING PROGRAM

## 2025-02-07 PROCEDURE — 95816 EEG AWAKE AND DROWSY: CPT

## 2025-02-07 PROCEDURE — 70498 CT ANGIOGRAPHY NECK: CPT

## 2025-02-07 PROCEDURE — 82746 ASSAY OF FOLIC ACID SERUM: CPT

## 2025-02-07 PROCEDURE — 70553 MRI BRAIN STEM W/O & W/DYE: CPT

## 2025-02-07 PROCEDURE — 70496 CT ANGIOGRAPHY HEAD: CPT

## 2025-02-07 PROCEDURE — 25510000002 GADOBENATE DIMEGLUMINE 529 MG/ML SOLUTION: Performed by: INTERNAL MEDICINE

## 2025-02-07 PROCEDURE — 82948 REAGENT STRIP/BLOOD GLUCOSE: CPT

## 2025-02-07 PROCEDURE — 80048 BASIC METABOLIC PNL TOTAL CA: CPT | Performed by: INTERNAL MEDICINE

## 2025-02-07 PROCEDURE — 63710000001 ASPIRIN 325 MG TABLET: Performed by: INTERNAL MEDICINE

## 2025-02-07 PROCEDURE — A9270 NON-COVERED ITEM OR SERVICE: HCPCS | Performed by: INTERNAL MEDICINE

## 2025-02-07 PROCEDURE — 96361 HYDRATE IV INFUSION ADD-ON: CPT

## 2025-02-07 PROCEDURE — 25510000001 IOPAMIDOL PER 1 ML: Performed by: INTERNAL MEDICINE

## 2025-02-07 PROCEDURE — 82306 VITAMIN D 25 HYDROXY: CPT

## 2025-02-07 PROCEDURE — 85025 COMPLETE CBC W/AUTO DIFF WBC: CPT | Performed by: INTERNAL MEDICINE

## 2025-02-07 PROCEDURE — 63710000001 ATORVASTATIN 80 MG TABLET: Performed by: INTERNAL MEDICINE

## 2025-02-07 PROCEDURE — 96360 HYDRATION IV INFUSION INIT: CPT

## 2025-02-07 RX ORDER — ATORVASTATIN CALCIUM 40 MG/1
40 TABLET, FILM COATED ORAL NIGHTLY
Qty: 30 TABLET | Refills: 0 | Status: SHIPPED | OUTPATIENT
Start: 2025-02-07

## 2025-02-07 RX ORDER — IOPAMIDOL 755 MG/ML
100 INJECTION, SOLUTION INTRAVASCULAR
Status: COMPLETED | OUTPATIENT
Start: 2025-02-07 | End: 2025-02-07

## 2025-02-07 RX ORDER — ASPIRIN 81 MG/1
81 TABLET ORAL DAILY
Qty: 30 TABLET | Refills: 0 | Status: SHIPPED | OUTPATIENT
Start: 2025-02-07

## 2025-02-07 RX ADMIN — GADOBENATE DIMEGLUMINE 14 ML: 529 INJECTION, SOLUTION INTRAVENOUS at 07:00

## 2025-02-07 RX ADMIN — SODIUM CHLORIDE 100 ML/HR: 9 INJECTION, SOLUTION INTRAVENOUS at 02:33

## 2025-02-07 RX ADMIN — Medication 10 ML: at 20:04

## 2025-02-07 RX ADMIN — ATORVASTATIN CALCIUM 80 MG: 80 TABLET, FILM COATED ORAL at 20:04

## 2025-02-07 RX ADMIN — Medication 10 ML: at 09:44

## 2025-02-07 RX ADMIN — IOPAMIDOL 95 ML: 755 INJECTION, SOLUTION INTRAVENOUS at 19:11

## 2025-02-07 RX ADMIN — ASPIRIN 325 MG ORAL TABLET 325 MG: 325 PILL ORAL at 09:44

## 2025-02-07 RX ADMIN — Medication 10 ML: at 02:33

## 2025-02-07 NOTE — PLAN OF CARE
Problem: Adult Inpatient Plan of Care  Goal: Plan of Care Review  2/7/2025 0501 by Erin Bright RN  Outcome: Progressing  Flowsheets (Taken 2/7/2025 0501)  Plan of Care Reviewed With:   patient   family  2/7/2025 0500 by Erin Bright RN  Outcome: Progressing  Goal: Patient-Specific Goal (Individualized)  2/7/2025 0501 by Erin Bright RN  Outcome: Progressing  2/7/2025 0500 by Erin Bright RN  Outcome: Progressing  Goal: Absence of Hospital-Acquired Illness or Injury  2/7/2025 0501 by Erin Bright RN  Outcome: Progressing  2/7/2025 0500 by Erin Bright RN  Outcome: Progressing  Intervention: Identify and Manage Fall Risk  Description: Perform standard risk assessment on admission using a validated tool or comprehensive approach appropriate to the patient; reassess fall risk frequently, with change in status or transfer to another level of care.  Communicate risk to interprofessional healthcare team; ensure fall risk visible cue.  Determine need for increased observation, equipment and environmental modification, as well as use of supportive, nonskid footwear.  Adjust safety measures to individual needs and identified risk factors.  Reinforce the importance of active participation with fall risk prevention, safety, and physical activity with the patient and family.  Perform regular intentional rounding to assess need for position change, pain assessment and personal needs, including assistance with toileting.  Recent Flowsheet Documentation  Taken 2/7/2025 0400 by Eirn Bright RN  Safety Promotion/Fall Prevention:   activity supervised   fall prevention program maintained   nonskid shoes/slippers when out of bed   safety round/check completed  Taken 2/7/2025 0200 by Erin Bright RN  Safety Promotion/Fall Prevention:   nonskid shoes/slippers when out of bed   safety round/check completed  Taken 2/7/2025 0000 by Erin Birght RN  Safety Promotion/Fall Prevention:   nonskid shoes/slippers when  out of bed   safety round/check completed  Taken 2/6/2025 2200 by Erin Bright RN  Safety Promotion/Fall Prevention:   nonskid shoes/slippers when out of bed   safety round/check completed  Taken 2/6/2025 2100 by Erin Bright RN  Safety Promotion/Fall Prevention:   nonskid shoes/slippers when out of bed   safety round/check completed  Taken 2/6/2025 2000 by Erin Bright RN  Safety Promotion/Fall Prevention:   nonskid shoes/slippers when out of bed   safety round/check completed  Intervention: Prevent Skin Injury  Description: Perform a screening for skin injury risk, such as pressure or moisture-associated skin damage on admission and at regular intervals throughout hospital stay.  Keep all areas of skin (especially folds) clean and dry.  Maintain adequate skin hydration.  Relieve and redistribute pressure and protect bony prominences and skin at risk for injury; implement measures based on patient-specific risk factors.  Match turning and repositioning schedule to clinical condition.  Encourage weight shift frequently; assist with reposition if unable to complete independently.  Float heels off bed; avoid pressure on the Achilles tendon.  Keep skin free from extended contact with medical devices.  Optimize nutrition and hydration.  Encourage functional activity and mobility, as early as tolerated.  Use aids (e.g., slide boards, mechanical lift) during transfer.  Recent Flowsheet Documentation  Taken 2/7/2025 0400 by Erin Bright RN  Body Position: position changed independently  Taken 2/7/2025 0200 by Erin Bright RN  Body Position: position changed independently  Taken 2/7/2025 0000 by Erin Bright RN  Body Position: position changed independently  Taken 2/6/2025 2200 by Erin Bright RN  Body Position: position changed independently  Taken 2/6/2025 2100 by Erin Brgiht RN  Body Position: position changed independently  Skin Protection: drying agents applied  Taken 2/6/2025 2000 by Kaila  GONZALO Khan  Body Position: position changed independently  Intervention: Prevent and Manage VTE (Venous Thromboembolism) Risk  Description: Assess for VTE (venous thromboembolism) risk.  Promote early mobilization; encourage both active and passive leg exercises, if unable to ambulate.  Initiate and maintain compression or other therapy, as indicated, based on identified risk in accordance with organizational protocol and provider order.  Recognize the patient's individual risk for bleeding before initiating pharmacologic thromboprophylaxis.  Recent Flowsheet Documentation  Taken 2/6/2025 2100 by Erin Bright RN  VTE Prevention/Management: (pt educated on VTE preacautions)   bilateral   SCDs (sequential compression devices) off   patient refused intervention  Intervention: Prevent Infection  Description: Maintain skin and mucous membrane integrity; promote hand, oral and pulmonary hygiene.  Optimize fluid balance, nutrition, sleep and glycemic control to maximize infection resistance.  Identify potential sources of infection early to prevent or mitigate progression of infection (e.g., wound, lines, devices).  Evaluate ongoing need for invasive devices; remove promptly when no longer indicated.  Review vaccination status.  Recent Flowsheet Documentation  Taken 2/7/2025 0400 by Erin Bright RN  Infection Prevention:   hand hygiene promoted   single patient room provided  Taken 2/7/2025 0200 by Erin Bright RN  Infection Prevention:   hand hygiene promoted   single patient room provided  Taken 2/7/2025 0000 by Erin Bright RN  Infection Prevention:   hand hygiene promoted   single patient room provided  Taken 2/6/2025 2200 by Erin Bright RN  Infection Prevention:   hand hygiene promoted   single patient room provided  Taken 2/6/2025 2100 by Erin Bright RN  Infection Prevention:   hand hygiene promoted   rest/sleep promoted   single patient room provided  Taken 2/6/2025 2000 by Erin Bright  RN  Infection Prevention:   hand hygiene promoted   single patient room provided  Goal: Optimal Comfort and Wellbeing  2/7/2025 0501 by Erin Bright RN  Outcome: Progressing  2/7/2025 0500 by Erin Bright RN  Outcome: Progressing  Intervention: Provide Person-Centered Care  Description: Use a family-focused approach to care; encourage support system presence and participation.  Develop trust and rapport by proactively providing information, encouraging questions, addressing concerns and offering reassurance.  Acknowledge emotional response to hospitalization.  Recognize and utilize personal coping strategies and strengths; develop goals via shared decision-making.  Honor spiritual and cultural preferences.  Recent Flowsheet Documentation  Taken 2/6/2025 2100 by Erin Bright RN  Trust Relationship/Rapport:   care explained   questions answered  Taken 2/6/2025 2000 by Erin Bright RN  Trust Relationship/Rapport:   care explained   questions answered   choices provided  Goal: Readiness for Transition of Care  2/7/2025 0501 by Erin Bright RN  Outcome: Progressing  2/7/2025 0500 by Erin Bright RN  Outcome: Progressing     Problem: Fluid Volume Deficit  Goal: Fluid Balance  2/7/2025 0501 by Erin Bright RN  Outcome: Progressing  2/7/2025 0500 by Erin Bright RN  Outcome: Progressing  Intervention: Monitor and Manage Hypovolemia  Description: Assess fluid requirements and deficit to determine fluid therapy strategy.  Keep accurate intake, output and daily weight; monitor trends and fluid responsiveness.  Anticipate the need for fluid supplementation to achieve balance.  Evaluate causes and potential sources that may lead to imbalance, such as illness severity, organ failure, mental status, mobility limitations, swallowing difficulties, intravenous fluid and medication side effects.  Monitor laboratory value trends and need for treatment adjustment.  Assess need for ongoing intravenous fluid  therapy; encourage oral intake when able.  Recent Flowsheet Documentation  Taken 2/7/2025 0233 by Erin Bright, RN  Fluid/Electrolyte Management: intravenous fluid replacement initiated     Problem: Fall Injury Risk  Goal: Absence of Fall and Fall-Related Injury  Outcome: Progressing   Goal Outcome Evaluation:  Plan of Care Reviewed With: patient, family   The patient remains alert and oriented x4, no deficits noted, NIH scored 0 with no acute changes noted, IV fluids initiated for fluid replacement. Labs pending, Neuro consult pending, MRI of brain wwo pending. CTA H/N pending. No overt changes from baseline.

## 2025-02-07 NOTE — PLAN OF CARE
Goal Outcome Evaluation:  Plan of Care Reviewed With: patient        Progress: improving  Outcome Evaluation: Pt is A&OX4, calm and cooperative, pt with wording finding difficulties at times. Meds whole with water. Con of B&B, with some incontince. CGA. Pt eager to DC home. Neuro following. Pt able to make needs known, call light within reach.

## 2025-02-07 NOTE — DISCHARGE SUMMARY
"Date of Admission: 2/6/2025  Date of Discharge:  2/8/2025  Primary Care Physician: Reva Mcleod MD     Discharge Diagnosis:  Active Hospital Problems    Diagnosis  POA    **Transient global amnesia [G45.4]  Yes    Acute memory impairment [R41.3]  Yes    Hypothyroidism [E03.9]  Yes      Resolved Hospital Problems   No resolved problems to display.       Presenting Problem/History of Present Illness:  Transient global amnesia [G45.4]     Hospital Course:  The patient is a 84 y.o. female with a history of hypothyroidism from Hashimoto's Thyroiditis otherwise healthy who presented with severe forgetfulness and word-finding difficulties. Please see admission H&P for further details. She had no evidence of infection, intoxication, or significant metabolic derangement. She had a brain MRI and CTA of the head and neck which were negative aside from small vessel ischemic disease and she was started on aspirin and lipitor. She had a normal EEG. She is feeling much better and is medically stable. She will be discharged home and should keep close PCP follow up in addition to neurology follow up.    Exam Today:  Blood pressure 163/69, pulse 51, temperature 98.1 °F (36.7 °C), temperature source Oral, resp. rate 18, height 165.1 cm (65\"), weight 70.3 kg (155 lb), SpO2 96%.  Vitals and nursing note reviewed.   Constitutional:       General: She is not in acute distress.     Appearance: She is not ill-appearing, toxic-appearing or diaphoretic.   HENT:      Head: Normocephalic and atraumatic.      Nose: Nose normal.      Mouth/Throat:      Mouth: Mucous membranes are moist.      Pharynx: Oropharynx is clear.   Eyes:      Conjunctiva/sclera: Conjunctivae normal.      Pupils: Pupils are equal, round, and reactive to light.   Cardiovascular:      Rate and Rhythm: Normal rate and regular rhythm.      Pulses: Normal pulses.   Pulmonary:      Effort: Pulmonary effort is normal.      Breath sounds: Normal breath sounds.   Abdominal:    "   General: Bowel sounds are normal. There is no distension.      Palpations: Abdomen is soft.      Tenderness: There is no abdominal tenderness.   Musculoskeletal:         General: No swelling or tenderness.      Cervical back: Neck supple.   Skin:     General: Skin is warm and dry.      Capillary Refill: Capillary refill takes less than 2 seconds.   Neurological:      General: No focal deficit present.      Mental Status: She is alert and oriented to person, place, and time.   Psychiatric:         Mood and Affect: Mood normal.         Behavior: Behavior normal.     Pertinent Results:  MRI BRAIN W WO CONTRAST-2/7/2025   HISTORY: Follow-up stroke.     Multiple pre and postcontrast sagittal axial and coronal images were  obtained through the brain.     The diffusion weighted images show no evidence of acute infarction.     T2 FLAIR images show moderately extensive bright signal in the bilateral  cerebral white matter consistent with small vessel white matter ischemic  disease. There is some bright signal within the steven as well also likely  small vessel ischemic disease. Normal-appearing vascular flow voids are  seen. There is mucosal thickening in the left maxillary sinus. The  craniocervical junction and sella appear within normal limits. No  intracranial hemorrhage is seen.     No abnormal enhancement is seen following gadolinium.     IMPRESSION:  1. No evidence of acute infarction or hemorrhage.  2. Moderately severe changes of small vessel ischemic disease.  3. No abnormal enhancement is seen.    CT ANGIOGRAM HEAD W AI ANALYSIS OF LVO-, CT ANGIOGRAM NECK-2/6/25   INDICATIONS: Stroke     TECHNIQUE: Radiation dose reduction techniques were utilized, including  automated exposure control and exposure modulation based on body  size.Noncontrast head CT was performed, followed by enhanced CT  angiography of the head and neck. Three-dimensional reconstructions were  created, reviewed, and assessed according to NASCET  criteria. AI  analysis of LVO was utilized.     COMPARISON: Correlated with MRI from 2/7/2025     FINDINGS:     No acute intracranial hemorrhage, midline shift or mass effect. No acute  territorial infarct is identified. Bilateral basal ganglia  mineralization is present.     Prominent periventricular hypodensity suggests chronic small vessel  ischemic change in a patient this age.     No enhancing lesions of brain are noted following contrast material  administration.     Arterial calcifications are seen at the base of the brain.     Ventricles, cisterns, cerebral sulci are unremarkable for patient age.     Mild paranasal sinus mucosal thickening is present. The visualized  paranasal sinuses, orbits, mastoid air cells are otherwise unremarkable.        The CT angiography images show no hemodynamically significant focal  stenosis, aneurysm, or dissection in the cervical carotid or vertebral  arteries, or in the arteries at the base of the brain.     Facet and uncovertebral joint hypertrophy contribute to neuroforaminal  narrowing, more prominent on the right at C6/7.        Partly included upper lungs show old granulomatous disease, mild  atelectasis. An indeterminate subpleural 5 mm nodule in the right upper  lobe is seen on axial image 38, new from 12/8/2020, could represent  neoplasm, recommend 3-month follow-up chest CT to characterize change.  Mild fibronodular changes are seen at the lung apices.        IMPRESSION:  1. No hemodynamically significant focal stenosis, aneurysm, or  dissection in the cervical carotid or vertebral arteries or in the  arteries at the base of the brain.     2. No acute intracranial hemorrhage or hydrocephalus. No enhancing  lesion in brain. If there is further clinical concern, MRI could be  considered for further evaluation.     3. Indeterminate right upper lobe pulmonary nodule, interval follow-up  CT in 3 months recommended to characterize change (possibility of  neoplasm not  excluded).    Technical description:  This is a 21 channel digital EEG recording that began on 1252 and ended on 1318.      Background:  Up to 10 Hz alpha activity is noted over the posterior head regions that is symmetric, well formed and reactive to eye closure.  The patient enters the drowsy state and sleeps during the record.  Sleep activities are symmetric and contain K complexes.  Hyperventilation was not performed and photic stimulation did not induce an abnormal driving response.  There are no marked continuous asymmetries between the two hemispheres.  No interictal epileptiform activity is present.     The EKG monitor shows a heart rate that is bradycardic.     Clinical Interpretation:  This routine EEG recording is normal in the awake and sleep states.  No potentially epileptogenic activity, seizure activity, or focal slowing is present.    Consults:   Consults       Date and Time Order Name Status Description    2/6/2025  6:07 PM Inpatient Neurology Consult Stroke Completed              Discharge Disposition:  Home or Self Care    Discharge Medications:     Discharge Medications        New Medications        Instructions Start Date   Aspirin Low Dose 81 MG EC tablet  Generic drug: aspirin   81 mg, Oral, Daily      atorvastatin 40 MG tablet  Commonly known as: LIPITOR   40 mg, Oral, Nightly             Changes to Medications        Instructions Start Date   levothyroxine 100 MCG tablet  Commonly known as: SYNTHROID, LEVOTHROID  What changed: Another medication with the same name was removed. Continue taking this medication, and follow the directions you see here.   100 mcg, Oral, Daily             Continue These Medications        Instructions Start Date   FLUoxetine 20 MG capsule  Commonly known as: PROzac   Take  by mouth.      Oxytrol For Women 3.9 MG/24HR  Generic drug: oxybutynin   1 patch, 2 Times Weekly               Discharge Diet:   Diet Instructions       Diet: Regular/House Diet; Regular (IDDSI  7); Thin (IDDSI 0)      Discharge Diet: Regular/House Diet    Texture: Regular (IDDSI 7)    Fluid Consistency: Thin (IDDSI 0)            Activity at Discharge:   Activity Instructions       Activity as Tolerated              Follow-up Appointments:  Future Appointments   Date Time Provider Department Center   2/19/2025  1:00 PM Patrick Sales MD [facility]     Additional Instructions for the Follow-ups that You Need to Schedule       Discharge Follow-up with PCP   As directed       Currently Documented PCP:    Reva Mcleod MD    PCP Phone Number:    [phone]     Follow Up Details: 1 week        Discharge Follow-up with Specified Provider: Dr. Sales (Neurology)   As directed      To: Dr. Sales (Neurology)   Follow Up Details: 5/19/2025                 Rod Reese MD  02/08/25  12:52 EST    Time Spent on Discharge Activities: Greater than 30 minutes.

## 2025-02-07 NOTE — CONSULTS
Neurology Consult Note    Consult Date: 2/6/2025    Referring MD: Reva Mcleod MD    Reason for Consult I have been asked to see the patient in neurological consultation to render advice and opinion regarding altered mental status and word finding difficulties.    Kaycee Kaufman is a 84 y.o. female with a past medical history of hypothyroidism and vitamin D deficiency presents to the ED with altered mental status and word finding difficulties.  Patient woke up this morning and has had multiple episodes of impaired short-term memory.  She will just have been told something and then almost immediately forget it.  For example, she was at her primary care provider today and with the significant confusion she was having he told her that she was being admitted to the hospital she said okay initially and expressed understanding.  Then he stepped out of the room for couple minutes and said okay you can head over to the hospital for direct admission and she questioned why she was getting into the hospital.  She has had several similar spells like this today per her granddaughter.  Granddaughter has been with her all day and she has not seen patient lose consciousness or automatisms, shaking.  She has denied any visual deficits, slurred speech, unilateral weakness/numbness, room spinning dizziness, worsening gait imbalance, facial droop, headache.  However, she did states she had a headache last about 5 minutes and was the most severe headache of her life Wednesday or Thursday last week.  It was located in the left temporal region and was a sharp pain.  She denied any associated nausea/vomiting, light or sound sensitivity or focal deficits.  She has never had a headache like this before and never had history of migraines.  Not typically prone to headaches.  She was last sick and the end of December with COVID.  She has been under significant amount of stress over the last week as her first  who she had two  children with passed away and she went to his .  Also has had a friend who was in a car accident.  Blood pressure on arrival was 159/70 mmHg and pulse of 55 bpm on arrival.    Per family, patient is alert and oriented x 4 at baseline.  She lives home alone and takes her own medications, drives, cooks and cleans.  Does not require any help with ADLs.  Ambulates independently.    Past Medical/Surgical Hx:  Past Medical History:   Diagnosis Date    Breast cyst     Hypothyroidism      Past Surgical History:   Procedure Laterality Date    BREAST CYST ASPIRATION      BREAST CYST EXCISION      SKIN SURGERY         Medications On Admission  Medications Prior to Admission   Medication Sig Dispense Refill Last Dose/Taking    FLUoxetine (PROzac) 20 MG capsule Take  by mouth.   Morning    levothyroxine (SYNTHROID, LEVOTHROID) 100 MCG tablet Take 1 tablet by mouth Daily.       levothyroxine (SYNTHROID, LEVOTHROID) 50 MCG tablet Take 1 tablet by mouth Daily.       oxybutynin (Oxytrol For Women) 3.9 MG/24HR Place 1 patch on the skin as directed by provider 2 (Two) Times a Week.          Allergies:  No Known Allergies    Social Hx:  Social History     Socioeconomic History    Marital status:    Tobacco Use    Smoking status: Never    Smokeless tobacco: Never   Vaping Use    Vaping status: Never Used   Substance and Sexual Activity    Alcohol use: Never    Drug use: Never    Sexual activity: Never       Family Hx:  Family History   Problem Relation Age of Onset    Breast cancer Paternal Aunt        Exam    /70 (BP Location: Right arm, Patient Position: Lying)   Pulse 55   Temp 98.1 °F (36.7 °C) (Oral)   Resp 18   SpO2 100%   gen: NAD, vitals reviewed  MS: oriented x3, short-term memory immediate recall 3/3 and repetition of immediate recall 1/3 and 3/3 with cueing, 3 minutes later recall 2/3 and 3/3 with cueing, mildly impaired attention/concentration, normal fluency, some mild word finding difficulties,  "normal repetition, follows multiple simple commands but struggles with multistep commands, no neglect  CN: visual acuity grossly normal, visual fields full, PERRL, EOMI, facial sensation equal, no facial droop, hearing symmetric, palate elevates symmetrically, shoulder shrug equal, tongue midline  Motor: 5/5 throughout upper and lower extremities, normal tone  Sensation: intact to light touch and vibration throughout  Coordination: no dysmetria with finger to nose bilaterally    DATA:    Lab Results   Component Value Date    GLUCOSE 97 10/01/2024    CALCIUM 9.6 10/01/2024     10/01/2024    K 4.5 10/01/2024    CO2 25.0 10/01/2024     10/01/2024    BUN 20 10/01/2024    CREATININE 0.74 10/01/2024    EGFRIFAFRI 82 06/08/2020    EGFRIFNONA 65 11/08/2021    BCR 27.0 (H) 10/01/2024    ANIONGAP 11.0 10/01/2024     Lab Results   Component Value Date    WBC 5.24 10/01/2024    HGB 14.3 10/01/2024    HCT 42.9 10/01/2024    MCV 93.7 10/01/2024     10/01/2024     No results found for: \"LDL\"  No results found for: \"HGBA1C\"  Lab Results   Component Value Date    INR 0.9 07/28/2014    PROTIME 10.2 07/28/2014       Lab review:       Imaging review:     Diagnoses:  Altered mental status  Word finding difficulties  Hypothyroidism    Comment: Patient had acute change in short-term memory and some word finding difficulties this morning.  Potentially could be stress reaction but will workup her up for infectious and metabolic causes.  She also did complain of a severe headache last week and not typically prone to headaches so we will order CTA head and neck to rule out aneurysm.  MRI brain with and without contrast has been ordered due to significant word finding difficulties and acute memory troubles.  Should also check a urine analysis, urine drug screen, ethanol level.  Will also check a TSH, vitamin B12, folate and vitamin D level.    PLAN:   CTA head and neck  MRI brain with and without contrast  Metabolic and " infectious work up per primary care team  UA, UDS, ethanol, vitamin B12, tsh, folate, vitamin d  Delirium precautions    Delirium precautions:  1. Frequent reorientation, reminding patient not questioning patient   2. Shades up during day/down at night   3. TV off except for soft music only   4. Familiar objects at bedside   5. Encourage friends/family to spend the night   6. Minimize anticholingeric medications   7. Minimize polypharmacy   8. Minimize restraints, includes lines and/or foleys and/or feeding tubes as able   9. Minimize overnight checks/vitals to encourage restful consistent sleep patterns   10. Out of bed during day as much as possible      Recommendations discussed with Dr. Sales who is in agreement with plan.

## 2025-02-07 NOTE — SIGNIFICANT NOTE
"   02/07/25 1031   OTHER   Discipline occupational therapist   Rehab Time/Intention   Session Not Performed other (see comments)  (OT attempts Eval, pt and family deny needing OT services and reports pt up ad arielle in room and (I) for ADLs. RN confirmed. Family also report pt is very active at baseline, and symptoms have resolved. Stating, \"she is just fine\". OT will s/o.)   Therapy Assessment/Plan (PT)   Criteria for Skilled Interventions Met (PT) no problems identified which require skilled intervention       "

## 2025-02-07 NOTE — PROGRESS NOTES
"DOS: 2025  NAME: Kaycee Kaufman   : 1940  PCP: Reva Mcleod MD  No chief complaint on file.      Chief complaint: Episode of altered mental status    Subjective: Patient been seen and evaluated, no acute events overnight this my first day evaluating the patient    Notes from initial consult note   84 y.o. female with a past medical history of hypothyroidism and vitamin D deficiency presents to the ED with altered mental status and word finding difficulties.  Patient woke up this morning and has had multiple episodes of impaired short-term memory.  She will just have been told something and then almost immediately forget it.  For example, she was at her primary care provider today and with the significant confusion she was having he told her that she was being admitted to the hospital she said okay initially and expressed understanding.  Then he stepped out of the room for couple minutes and said okay you can head over to the hospital for direct admission and she questioned why she was getting into the hospital.  She has had several similar spells like this today per her granddaughter.  Granddaughter has been with her all day and she has not seen patient lose consciousness or automatisms, shaking.  She has denied any visual deficits, slurred speech, unilateral weakness/numbness, room spinning dizziness, worsening gait imbalance, facial droop, headache.  However, she did states she had a headache last about 5 minutes and was the most severe headache of her life Wednesday or Thursday last week.  It was located in the left temporal region and was a sharp pain.     Objective:  Vital signs: /71 (BP Location: Right arm, Patient Position: Lying)   Pulse (!) 49   Temp 97.5 °F (36.4 °C) (Oral)   Resp 18   Ht 165.1 cm (65\")   Wt 70.3 kg (155 lb)   SpO2 99%   BMI 25.79 kg/m²    Gen: NAD, vitals reviewed  MS: oriented x3, recent/remote memory intact, normal attention/concentration, language intact, " no neglect.  CN: visual acuity grossly normal, PERRL, EOMI, no facial droop, no dysarthria  Motor: 5/5 throughout upper and lower extremities, normal tone  Sensory: intact to light touch all 4 ext.    Laboratory results:  Lab Results   Component Value Date    GLUCOSE 106 (H) 02/07/2025    CALCIUM 9.0 02/07/2025     02/07/2025    K 4.1 02/07/2025    CO2 23.3 02/07/2025     02/07/2025    BUN 18 02/07/2025    CREATININE 0.74 02/07/2025    EGFRIFAFRI 82 06/08/2020    EGFRIFNONA 65 11/08/2021    BCR 24.3 02/07/2025    ANIONGAP 11.7 02/07/2025     Lab Results   Component Value Date    WBC 5.53 02/07/2025    HGB 13.4 02/07/2025    HCT 41.4 02/07/2025    MCV 95.4 02/07/2025     02/07/2025     Lab Results   Component Value Date     (H) 02/06/2025         Lab 02/06/25  1907   HEMOGLOBIN A1C 5.50        Review of labs:   Sodium 141  Creatinine 0.74  Blood glucose 106  Normal LFT    A1c 5.5  TSH 1.47  B12 109 7 and folate 20      WBC 5.53  Hemoglobin 13.4 and platelets 240    Urine analysis negative  Urine tox negative    Review and interpretation of imaging:     MRI brain showed no acute diffusion restriction T2 flair suggestive of small vessel disease no SWI changes she has left maxillary sinus fluid level which is chronic    Diagnoses:  Episode of unresponsiveness  Acute stress reaction/grief    Hypothyroidism  Vitamin D deficiency      Assessment -patient is a relatively healthy 84-year-old with history of hypothyroidism vitamin D deficiency who recently lost her ex- who has been a good friend for her for several years has been feeling grief and dealing with that, she comes into the ER because yesterday when she woke up had trouble performing her day-to-day activities there was some concern for a stroke so she was brought into the ER.    Plan  MRI brain looks unremarkable for acute pathology  CTA head and neck and EEG reads pending.    Patient can be sent home after the imaging she  will see me in clinic on 5/19/2025.      MDM   Reviewed: Previous charts, nursing notes and vitals   Reviewed: Previous labs and MRI scan    Interpretation: Labs and MRI scan   Total time providing care is :30-74 minutes. This excluded time spent performing separately reportable procedures and services  Consults :Neurology/Stroke    Please note that portions of this note were completed with a voice recognition program.     Patrick Sales MD  Neuro Hospitalist /Vascular Neurology.

## 2025-02-07 NOTE — SIGNIFICANT NOTE
02/07/25 1058   OTHER   Discipline physical therapist   Therapy Assessment/Plan (PT)   Criteria for Skilled Interventions Met (PT) no;no problems identified which require skilled intervention  (Pt up ad arielle. no apparent mobility issues at this time, will defer formal PT eval.)

## 2025-02-07 NOTE — SIGNIFICANT NOTE
Received orders for stroke protocol. MRI negative for acute findings. Pt passed swallow screen and on a diet per MD. Will sign off. Please re consult as indicated.

## 2025-02-08 NOTE — NURSING NOTE
Pt was alert & oriented x4 with stable vital signs. She has discharge order, pt left the floor with discharge instructions, all her personal belongings accompanied by her granddaughter at this time in no respiratory distress.

## 2025-02-11 ENCOUNTER — TRANSCRIBE ORDERS (OUTPATIENT)
Dept: ADMINISTRATIVE | Facility: HOSPITAL | Age: 85
End: 2025-02-11
Payer: MEDICARE

## 2025-02-11 DIAGNOSIS — R91.1 PULMONARY NODULE: Primary | ICD-10-CM

## 2025-02-19 ENCOUNTER — OFFICE VISIT (OUTPATIENT)
Dept: NEUROLOGY | Facility: CLINIC | Age: 85
End: 2025-02-19
Payer: MEDICARE

## 2025-02-19 VITALS
DIASTOLIC BLOOD PRESSURE: 60 MMHG | HEIGHT: 65 IN | OXYGEN SATURATION: 97 % | SYSTOLIC BLOOD PRESSURE: 110 MMHG | HEART RATE: 58 BPM | WEIGHT: 154 LBS | BODY MASS INDEX: 25.66 KG/M2

## 2025-02-19 DIAGNOSIS — R41.0 EPISODE OF CONFUSION: Primary | ICD-10-CM

## 2025-02-19 RX ORDER — IVERMECTIN 10 MG/G
CREAM TOPICAL
COMMUNITY
Start: 2025-01-02

## 2025-02-19 NOTE — PROGRESS NOTES
CC: Episode of confusion    Interval history 2/19/2025  Patient denies any more episodes of confusion  Patient denies any new weakness numbness speech or vision  Patient is accompanied by her son today they are had several questions about the imaging done at the hospital I have answered all their questions  Patient denies any new cognitive problems  She was found to have a pulmonary nodule which is being followed up with a CT chest  Denies any seizure-like activity    Hospital History  84 y.o. female with a past medical history of hypothyroidism and vitamin D deficiency presents to the ED with altered mental status and word finding difficulties. Patient woke up this morning and has had multiple episodes of impaired short-term memory. She will just have been told something and then almost immediately forget it. For example, she was at her primary care provider today and with the significant confusion she was having he told her that she was being admitted to the hospital she said okay initially and expressed understanding. Then he stepped out of the room for couple minutes and said okay you can head over to the hospital for direct admission and she questioned why she was getting into the hospital. She has had several similar spells like this today per her granddaughter. Granddaughter has been with her all day and she has not seen patient lose consciousness or automatisms, shaking. She has denied any visual deficits, slurred speech, unilateral weakness/numbness, room spinning dizziness, worsening gait imbalance, facial droop, headache. However, she did states she had a headache last about 5 minutes and was the most severe headache of her life Wednesday or Thursday last week. It was located in the left temporal region and was a sharp pain.       Past Medical History:   Diagnosis Date    Breast cyst     Hypothyroidism          Past Surgical History:   Procedure Laterality Date    BREAST CYST ASPIRATION      BREAST CYST  EXCISION      SKIN SURGERY             Current Outpatient Medications:     FLUoxetine (PROzac) 20 MG capsule, Take  by mouth., Disp: , Rfl:     Ivermectin 1 % cream, apply to affected area every day, Disp: , Rfl:     levothyroxine (SYNTHROID, LEVOTHROID) 100 MCG tablet, Take 1 tablet by mouth Daily., Disp: , Rfl:     oxybutynin (Oxytrol For Women) 3.9 MG/24HR, Place 1 patch on the skin as directed by provider 2 (Two) Times a Week., Disp: , Rfl:     aspirin 81 MG EC tablet, Take 1 tablet by mouth Daily. (Patient not taking: Reported on 2/19/2025), Disp: 30 tablet, Rfl: 0    atorvastatin (LIPITOR) 40 MG tablet, Take 1 tablet by mouth Every Night. (Patient not taking: Reported on 2/19/2025), Disp: 30 tablet, Rfl: 0      Family History   Problem Relation Age of Onset    Breast cancer Paternal Aunt          Social History     Socioeconomic History    Marital status:    Tobacco Use    Smoking status: Never    Smokeless tobacco: Never   Vaping Use    Vaping status: Never Used   Substance and Sexual Activity    Alcohol use: Never    Drug use: Never    Sexual activity: Never         No Known Allergies      Pain Scale:        ROS:    Constitutional: [No fevers, chills, sweats or weight loss/gain]   Eye: [No change in vision, double vision, or loss of vision]   HEENT: [No headaches, tenderness, dizziness, or tinnitus. Normal smell and taste. Normal speech and swallowing]   Respiratory: [No shortness of breath, coughing, wheezing]   Cardiovascular: [No Chest pain, palpitations, syncope, MARCELINO]   Gastrointestinal: [Normal bowel function. No nausea, vomiting, diarrhea]   Genitourinary: [Normal bladder function]   Musculoskeletal: [No trauma, joint or neck pain, myalgias, cramping or weakness]   Skin: [No itching, burning, pain, rashes, or birthmarks]   Endocrinology: [No heat or cold intolerance]   Psychiatric: [No sleep disturbance. No anxiety or depression]   Neurologic: [See HPI, above]       Physical Exam:  Vitals:     "02/19/25 1317   BP: 110/60   Pulse: 58   SpO2: 97%   Weight: 69.9 kg (154 lb)   Height: 165.1 cm (65\")     Orthostatic BP:    Body mass index is 25.63 kg/m².    General appearance: Well developed, well nourished, well groomed, alert and cooperative.   HEENT: Normocephalic.   Cardiac: Regular rate and rhythm. No murmurs.   Chest Exam: Clear to auscultation bilaterally, no wheezes, no rhonchi.  Extremities: Normal, no edema.   Skin: No rashes or birthmarks.      Higher integrative function: Oriented to time, place, person, normal comprehension repetition and fluency  CN II: Normal visual acuity   CN III IV VI: Extraocular movements are full without nystagmus. Pupils are equal, round, and reactive to light.   CN V: Sensation same on both sides of the face  CN VII: Facial movements are symmetric, no weakness.   CN XII: The tongue is midline. No atrophy or fasciculations.   Motor: Good strength 5/5 both UE and LE B/L  no pronator drift. No fasciculations, rigidity, spasticity or abnormal movements.   Sensation: Normal sensation to pin prick and light touch all four extremities   Station and gait: Normal gait   Coordination: Finger to nose test showed no dysmetria      Lab Results   Component Value Date    GLUCOSE 106 (H) 02/07/2025    BUN 18 02/07/2025    CREATININE 0.74 02/07/2025    EGFRIFNONA 65 11/08/2021    EGFRIFAFRI 82 06/08/2020    BCR 24.3 02/07/2025    CO2 23.3 02/07/2025    CALCIUM 9.0 02/07/2025    ALBUMIN 4.1 02/06/2025    AST 23 02/06/2025    ALT 16 02/06/2025       Lab Results   Component Value Date    WBC 5.53 02/07/2025    HGB 13.4 02/07/2025    HCT 41.4 02/07/2025    MCV 95.4 02/07/2025     02/07/2025         .No results found for: \"RPR\"      Lab Results   Component Value Date    TSH 1.470 02/06/2025         Lab Results   Component Value Date    CWOUGMMO78 1,097 (H) 02/06/2025         Lab Results   Component Value Date    FOLATE >20.00 02/07/2025         Lab Results   Component Value Date    " HGBA1C 5.50 02/06/2025         Lab Results   Component Value Date    GLUCOSE 106 (H) 02/07/2025    BUN 18 02/07/2025    CREATININE 0.74 02/07/2025    EGFRIFNONA 65 11/08/2021    EGFRIFAFRI 82 06/08/2020    BCR 24.3 02/07/2025    K 4.1 02/07/2025    CO2 23.3 02/07/2025    CALCIUM 9.0 02/07/2025    ALBUMIN 4.1 02/06/2025    AST 23 02/06/2025    ALT 16 02/06/2025         Lab Results   Component Value Date    WBC 5.53 02/07/2025    HGB 13.4 02/07/2025    HCT 41.4 02/07/2025    MCV 95.4 02/07/2025     02/07/2025       Results for orders placed during the hospital encounter of 02/06/25    CT Angiogram Neck    Narrative  CT ANGIOGRAM HEAD W AI ANALYSIS OF LVO-, CT ANGIOGRAM NECK-    INDICATIONS: Stroke    TECHNIQUE: Radiation dose reduction techniques were utilized, including  automated exposure control and exposure modulation based on body  size.Noncontrast head CT was performed, followed by enhanced CT  angiography of the head and neck. Three-dimensional reconstructions were  created, reviewed, and assessed according to NASCET criteria. AI  analysis of LVO was utilized.    COMPARISON: Correlated with MRI from 2/7/2025    FINDINGS:    No acute intracranial hemorrhage, midline shift or mass effect. No acute  territorial infarct is identified. Bilateral basal ganglia  mineralization is present.    Prominent periventricular hypodensity suggests chronic small vessel  ischemic change in a patient this age.    No enhancing lesions of brain are noted following contrast material  administration.    Arterial calcifications are seen at the base of the brain.    Ventricles, cisterns, cerebral sulci are unremarkable for patient age.    Mild paranasal sinus mucosal thickening is present. The visualized  paranasal sinuses, orbits, mastoid air cells are otherwise unremarkable.      The CT angiography images show no hemodynamically significant focal  stenosis, aneurysm, or dissection in the cervical carotid or vertebral  arteries,  or in the arteries at the base of the brain.    Facet and uncovertebral joint hypertrophy contribute to neuroforaminal  narrowing, more prominent on the right at C6/7.      Partly included upper lungs show old granulomatous disease, mild  atelectasis. An indeterminate subpleural 5 mm nodule in the right upper  lobe is seen on axial image 38, new from 12/8/2020, could represent  neoplasm, recommend 3-month follow-up chest CT to characterize change.  Mild fibronodular changes are seen at the lung apices.    Impression  1. No hemodynamically significant focal stenosis, aneurysm, or  dissection in the cervical carotid or vertebral arteries or in the  arteries at the base of the brain.    2. No acute intracranial hemorrhage or hydrocephalus. No enhancing  lesion in brain. If there is further clinical concern, MRI could be  considered for further evaluation.    3. Indeterminate right upper lobe pulmonary nodule, interval follow-up  CT in 3 months recommended to characterize change (possibility of  neoplasm not excluded).    This report was finalized on 2/7/2025 7:49 PM by Dr. Ricardo Llamas M.D on Workstation: [id number]      Results for orders placed during the hospital encounter of 07/12/24    CT Head Without Contrast    Narrative  CT HEAD, FACIAL BONES AND CERVICAL SPINE WITHOUT CONTRAST    HISTORY: Fall, pain in above areas.    COMPARISON: CT head 12/9/2022.    CT HEAD WITHOUT CONTRAST: The brain and ventricles are symmetrical.  There is no evidence of intracranial hemorrhage, acute infarction or of  a calvarial fracture. No focal area of decreased attenuation to suggest  acute infarction is identified.    CT FACIAL BONES WITHOUT CONTRAST: An air-fluid level is present in the  left maxillary sinus. The walls of the maxillary sinus on the left are  thickened and sclerotic consistent with chronic sinusitis. There is no  evidence of fracture. The frontal, ethmoid, sphenoid and right maxillary  sinuses are clear. A  small periapical abscess is appreciated involving  tooth #4.    CT CERVICAL SPINE WITHOUT CONTRAST: There is moderate loss of disc  height at C6-7. There is 2 mm of anterolisthesis of C5 upon C6. There is  no evidence of prevertebral edema or of a cervical fracture.    C2-3: Moderate facet degenerative disease is present on the right.    C3-4: There is no evidence of a disc bulge or herniation.    C4-5: Mild facet degenerative disease is present on the left.    C5-6: A mild central disc osteophyte complex is present.    C6-7: A small central disc osteophyte complex is present with nodes of  herniation. Mild to moderate foraminal stenosis is present on the right  secondary to loss of disc height and uncovertebral degenerative disease.    C7-T1: There is no evidence of a disc bulge or herniation.    Impression  Degenerative disease involving the cervical spine is noted  as described above with no acute fracture. See above.    The above information was communicated to Dr. Mcleod following the  dictation.        Radiation dose reduction techniques were utilized, including automated  exposure control and exposure modulation based on body size.      This report was finalized on 7/12/2024 1:23 PM by Dr. Rodolfo Jordan M.D  on Workstation: [id number]      Results for orders placed during the hospital encounter of 12/09/22    CT Head Without Contrast    Narrative  CT HEAD WITHOUT CONTRAST    HISTORY: Head trauma, left-sided injury, headache.    FINDINGS: There is age-appropriate atrophy. There is no evidence of  hemorrhage, hydrocephalus or of abnormal extra-axial fluid. Bone windows  showed no evidence of a calvarial fracture. No focal area of decreased  attenuation to suggest acute infarction or contusion is appreciated.  Areas of decreased attenuation involving the white matter of the  cerebral hemispheres are noted bilaterally consistent with mild small  vessel ischemic disease. Mild vascular calcification involving  the  carotid siphons are noted bilaterally.    Impression  There is no evidence of fracture or hemorrhage. See above.  Further evaluation could be performed with a MRI examination of the  brain as indicated. The above information was communicated to Dr. Mcleod  following the dictation.        Radiation dose reduction techniques were utilized, including automated  exposure control and exposure modulation based on body size.    This report was finalized on 12/12/2022 2:46 PM by Dr. Rodolfo Jordan M.D.               A1c 5.5  TSH 1.47  B12 109 7 and folate 20        Urine analysis negative  Urine tox negative     Review and interpretation of imaging:      MRI brain showed no acute diffusion restriction T2 flair suggestive of small vessel disease no SWI changes she has left maxillary sinus fluid level which is chronic    CTA head and neck did not show any large vessel occlusion stenosis or atherosclerosis    EEG EEG recording is normal in the awake and sleep states. No potentially epileptogenic activity, seizure activity, or focal slowing is present.      Diagnoses:  Episode of unresponsiveness/confusion  History of Acute stress reaction/grief     Hypothyroidism  Vitamin D deficiency        Assessment -patient is a relatively healthy 84-year-old with history of hypothyroidism vitamin D deficiency who recently lost her ex- who has been a good friend for her for several years has been feeling grief and dealing with that, she comes into the ER on 2/6/2025 because  when she woke up had trouble performing her day-to-day activities there was some concern for a stroke so she was brought into the ER.  MRI CTAs EEG were all unremarkable patient's feels remarkably well denies any new similar episodes     Plan  Okay to discontinue aspirin and statin unclear if this is a TGA//TIA  Follow-up with PCP  Return to clinic as needed     Diagnoses and all orders for this visit:    1. Episode of confusion  (Primary)          For history of TIA/stroke  Continue antiplatelet/AC as prescribed.  HLD: Goal LDL <70, should continue surveillance labs and management with PCP  HTN: goal of asymptomatic normotension. If elevated pt should reach out to PCP office, and if remains elevated at home go to urgent care and/or emergency room  DM: Continue monitoring of and control of blood sugars per PCP and/or endocrinology  Secondary stroke prevention: Ideal targets for stroke prevention would be Blood pressure < 130/80; LDL < 70; HbA1c < 6.5 and smoking cessation if applicable.  Call 911 for stroke symptoms      MDM   Reviewed: Previous charts, nursing notes and vitals   Reviewed: Previous labs and CT CTA/MRI scan    Interpretation: Labs and CT/CTA/MRI scan   Total time providing care is :30-74 minutes. This excluded time spent performing separately reportable procedures and services  Consults :Neurology/Stroke    Please note that portions of this note were completed with a voice recognition program.     Patrick Sales MD  Neuro Hospitalist /Vascular Neurology.                       Dictated utilizing Dragon dictation.    Initiate Treatment: betamethasone, augmented 0.05 % topical cream \\nQuantity: 50.0 g  Days Supply: 30\\nSig: Apply topically BID to the chest for two weeks Detail Level: Simple

## 2025-05-21 ENCOUNTER — HOSPITAL ENCOUNTER (OUTPATIENT)
Dept: CT IMAGING | Facility: HOSPITAL | Age: 85
Discharge: HOME | End: 2025-05-21
Payer: MEDICARE

## 2025-06-25 ENCOUNTER — HOSPITAL ENCOUNTER (OUTPATIENT)
Dept: CT IMAGING | Facility: HOSPITAL | Age: 85
Discharge: HOME | End: 2025-06-25
Admitting: INTERNAL MEDICINE
Payer: MEDICARE

## 2025-06-25 DIAGNOSIS — R91.1 PULMONARY NODULE: ICD-10-CM

## 2025-06-25 PROCEDURE — 71250 CT THORAX DX C-: CPT

## 2025-07-11 ENCOUNTER — TRANSCRIBE ORDERS (OUTPATIENT)
Dept: ADMINISTRATIVE | Facility: HOSPITAL | Age: 85
End: 2025-07-11
Payer: MEDICARE

## 2025-07-11 DIAGNOSIS — R91.1 PULMONARY NODULE, RIGHT: Primary | ICD-10-CM
